# Patient Record
Sex: MALE | Race: WHITE | Employment: FULL TIME | URBAN - METROPOLITAN AREA
[De-identification: names, ages, dates, MRNs, and addresses within clinical notes are randomized per-mention and may not be internally consistent; named-entity substitution may affect disease eponyms.]

---

## 2019-02-05 ENCOUNTER — ANESTHESIA EVENT (OUTPATIENT)
Dept: SURGERY | Age: 34
DRG: 519 | End: 2019-02-05
Payer: COMMERCIAL

## 2019-02-05 ENCOUNTER — HOSPITAL ENCOUNTER (EMERGENCY)
Dept: MRI IMAGING | Age: 34
Discharge: HOME | DRG: 519 | End: 2019-02-05
Attending: EMERGENCY MEDICINE
Payer: COMMERCIAL

## 2019-02-05 ENCOUNTER — ANESTHESIA (OUTPATIENT)
Dept: SURGERY | Age: 34
DRG: 519 | End: 2019-02-05
Payer: COMMERCIAL

## 2019-02-05 ENCOUNTER — HOSPITAL ENCOUNTER (INPATIENT)
Age: 34
LOS: 1 days | Discharge: HOME | DRG: 519 | End: 2019-02-06
Attending: EMERGENCY MEDICINE | Admitting: FAMILY MEDICINE
Payer: COMMERCIAL

## 2019-02-05 ENCOUNTER — APPOINTMENT (OUTPATIENT)
Dept: GENERAL RADIOLOGY | Age: 34
DRG: 519 | End: 2019-02-05
Attending: NEUROLOGICAL SURGERY
Payer: COMMERCIAL

## 2019-02-05 ENCOUNTER — APPOINTMENT (OUTPATIENT)
Dept: ULTRASOUND IMAGING | Age: 34
DRG: 519 | End: 2019-02-05
Attending: FAMILY MEDICINE
Payer: COMMERCIAL

## 2019-02-05 ENCOUNTER — APPOINTMENT (OUTPATIENT)
Dept: GENERAL RADIOLOGY | Age: 34
DRG: 519 | End: 2019-02-05
Attending: EMERGENCY MEDICINE
Payer: COMMERCIAL

## 2019-02-05 DIAGNOSIS — M21.372 FOOT DROP, LEFT: ICD-10-CM

## 2019-02-05 DIAGNOSIS — M51.26 RUPTURED LUMBAR INTERVERTEBRAL DISC: Primary | ICD-10-CM

## 2019-02-05 DIAGNOSIS — Z98.890 S/P DISKECTOMY: ICD-10-CM

## 2019-02-05 DIAGNOSIS — Z98.890 S/P LUMBAR LAMINECTOMY: ICD-10-CM

## 2019-02-05 DIAGNOSIS — M51.26 LUMBAR DISC HERNIATION: ICD-10-CM

## 2019-02-05 LAB
ABO + RH BLD: NORMAL
ALBUMIN SERPL-MCNC: 3.9 G/DL (ref 3.5–5)
ALBUMIN/GLOB SERPL: 1.1 {RATIO} (ref 1.1–2.2)
ALP SERPL-CCNC: 53 U/L (ref 45–117)
ALT SERPL-CCNC: 135 U/L (ref 12–78)
ANION GAP SERPL CALC-SCNC: 11 MMOL/L (ref 5–15)
APPEARANCE UR: CLEAR
APTT PPP: 22.4 SEC (ref 22.1–32)
AST SERPL-CCNC: 55 U/L (ref 15–37)
ATRIAL RATE: 97 BPM
BACTERIA URNS QL MICRO: ABNORMAL /HPF
BASOPHILS # BLD: 0 K/UL (ref 0–0.1)
BASOPHILS NFR BLD: 0 % (ref 0–1)
BILIRUB SERPL-MCNC: 0.5 MG/DL (ref 0.2–1)
BILIRUB UR QL: NEGATIVE
BLOOD GROUP ANTIBODIES SERPL: NORMAL
BUN SERPL-MCNC: 14 MG/DL (ref 6–20)
BUN/CREAT SERPL: 14 (ref 12–20)
CALCIUM SERPL-MCNC: 8.6 MG/DL (ref 8.5–10.1)
CALCULATED P AXIS, ECG09: 62 DEGREES
CALCULATED R AXIS, ECG10: 45 DEGREES
CALCULATED T AXIS, ECG11: 45 DEGREES
CHLORIDE SERPL-SCNC: 101 MMOL/L (ref 97–108)
CO2 SERPL-SCNC: 26 MMOL/L (ref 21–32)
COLOR UR: ABNORMAL
COMMENT, HOLDF: NORMAL
CREAT SERPL-MCNC: 0.97 MG/DL (ref 0.7–1.3)
DIAGNOSIS, 93000: NORMAL
DIFFERENTIAL METHOD BLD: ABNORMAL
EOSINOPHIL # BLD: 0 K/UL (ref 0–0.4)
EOSINOPHIL NFR BLD: 0 % (ref 0–7)
EPITH CASTS URNS QL MICRO: ABNORMAL /LPF
ERYTHROCYTE [DISTWIDTH] IN BLOOD BY AUTOMATED COUNT: 13.2 % (ref 11.5–14.5)
GLOBULIN SER CALC-MCNC: 3.7 G/DL (ref 2–4)
GLUCOSE SERPL-MCNC: 111 MG/DL (ref 65–100)
GLUCOSE UR STRIP.AUTO-MCNC: NEGATIVE MG/DL
HCT VFR BLD AUTO: 48.2 % (ref 36.6–50.3)
HGB BLD-MCNC: 16.4 G/DL (ref 12.1–17)
HGB UR QL STRIP: NEGATIVE
IMM GRANULOCYTES # BLD AUTO: 0 K/UL (ref 0–0.04)
IMM GRANULOCYTES NFR BLD AUTO: 0 % (ref 0–0.5)
INR PPP: 1 (ref 0.9–1.1)
KETONES UR QL STRIP.AUTO: NEGATIVE MG/DL
LEUKOCYTE ESTERASE UR QL STRIP.AUTO: NEGATIVE
LYMPHOCYTES # BLD: 0.7 K/UL (ref 0.8–3.5)
LYMPHOCYTES NFR BLD: 10 % (ref 12–49)
MCH RBC QN AUTO: 29.7 PG (ref 26–34)
MCHC RBC AUTO-ENTMCNC: 34 G/DL (ref 30–36.5)
MCV RBC AUTO: 87.2 FL (ref 80–99)
MONOCYTES # BLD: 0.1 K/UL (ref 0–1)
MONOCYTES NFR BLD: 2 % (ref 5–13)
NEUTS SEG # BLD: 6.3 K/UL (ref 1.8–8)
NEUTS SEG NFR BLD: 88 % (ref 32–75)
NITRITE UR QL STRIP.AUTO: NEGATIVE
NRBC # BLD: 0 K/UL (ref 0–0.01)
NRBC BLD-RTO: 0 PER 100 WBC
P-R INTERVAL, ECG05: 166 MS
PH UR STRIP: 7 [PH] (ref 5–8)
PLATELET # BLD AUTO: 242 K/UL (ref 150–400)
PMV BLD AUTO: 10.2 FL (ref 8.9–12.9)
POTASSIUM SERPL-SCNC: 4.7 MMOL/L (ref 3.5–5.1)
PROT SERPL-MCNC: 7.6 G/DL (ref 6.4–8.2)
PROT UR STRIP-MCNC: ABNORMAL MG/DL
PROTHROMBIN TIME: 10 SEC (ref 9–11.1)
Q-T INTERVAL, ECG07: 320 MS
QRS DURATION, ECG06: 82 MS
QTC CALCULATION (BEZET), ECG08: 406 MS
RBC # BLD AUTO: 5.53 M/UL (ref 4.1–5.7)
RBC #/AREA URNS HPF: ABNORMAL /HPF (ref 0–5)
RBC MORPH BLD: ABNORMAL
SAMPLES BEING HELD,HOLD: NORMAL
SODIUM SERPL-SCNC: 138 MMOL/L (ref 136–145)
SP GR UR REFRACTOMETRY: 1.01 (ref 1–1.03)
SPECIMEN EXP DATE BLD: NORMAL
THERAPEUTIC RANGE,PTTT: NORMAL SECS (ref 58–77)
UR CULT HOLD, URHOLD: NORMAL
UROBILINOGEN UR QL STRIP.AUTO: 0.2 EU/DL (ref 0.2–1)
VENTRICULAR RATE, ECG03: 97 BPM
WBC # BLD AUTO: 7.1 K/UL (ref 4.1–11.1)
WBC URNS QL MICRO: ABNORMAL /HPF (ref 0–4)

## 2019-02-05 PROCEDURE — 77030003028 HC SUT VCRL J&J -A: Performed by: NEUROLOGICAL SURGERY

## 2019-02-05 PROCEDURE — 65660000000 HC RM CCU STEPDOWN

## 2019-02-05 PROCEDURE — 77030014650 HC SEAL MTRX FLOSEL BAXT -C: Performed by: NEUROLOGICAL SURGERY

## 2019-02-05 PROCEDURE — 99283 EMERGENCY DEPT VISIT LOW MDM: CPT

## 2019-02-05 PROCEDURE — 74011250636 HC RX REV CODE- 250/636: Performed by: EMERGENCY MEDICINE

## 2019-02-05 PROCEDURE — 77030029099 HC BN WAX SSPC -A: Performed by: NEUROLOGICAL SURGERY

## 2019-02-05 PROCEDURE — 74011250636 HC RX REV CODE- 250/636

## 2019-02-05 PROCEDURE — 99284 EMERGENCY DEPT VISIT MOD MDM: CPT

## 2019-02-05 PROCEDURE — 77030011264 HC ELECTRD BLD EXT COVD -A: Performed by: NEUROLOGICAL SURGERY

## 2019-02-05 PROCEDURE — 85025 COMPLETE CBC W/AUTO DIFF WBC: CPT

## 2019-02-05 PROCEDURE — 77030003029 HC SUT VCRL J&J -B: Performed by: NEUROLOGICAL SURGERY

## 2019-02-05 PROCEDURE — 85610 PROTHROMBIN TIME: CPT

## 2019-02-05 PROCEDURE — 0ST20ZZ RESECTION OF LUMBAR VERTEBRAL DISC, OPEN APPROACH: ICD-10-PCS | Performed by: NEUROLOGICAL SURGERY

## 2019-02-05 PROCEDURE — 77030026438 HC STYL ET INTUB CARD -A: Performed by: NURSE ANESTHETIST, CERTIFIED REGISTERED

## 2019-02-05 PROCEDURE — 74011250637 HC RX REV CODE- 250/637: Performed by: FAMILY MEDICINE

## 2019-02-05 PROCEDURE — 77030008684 HC TU ET CUF COVD -B: Performed by: NURSE ANESTHETIST, CERTIFIED REGISTERED

## 2019-02-05 PROCEDURE — 93005 ELECTROCARDIOGRAM TRACING: CPT

## 2019-02-05 PROCEDURE — 77030013079 HC BLNKT BAIR HGGR 3M -A: Performed by: NURSE ANESTHETIST, CERTIFIED REGISTERED

## 2019-02-05 PROCEDURE — 80307 DRUG TEST PRSMV CHEM ANLYZR: CPT

## 2019-02-05 PROCEDURE — 72148 MRI LUMBAR SPINE W/O DYE: CPT

## 2019-02-05 PROCEDURE — 81001 URINALYSIS AUTO W/SCOPE: CPT

## 2019-02-05 PROCEDURE — 76705 ECHO EXAM OF ABDOMEN: CPT

## 2019-02-05 PROCEDURE — 88304 TISSUE EXAM BY PATHOLOGIST: CPT

## 2019-02-05 PROCEDURE — 74011000250 HC RX REV CODE- 250: Performed by: NEUROLOGICAL SURGERY

## 2019-02-05 PROCEDURE — 96374 THER/PROPH/DIAG INJ IV PUSH: CPT

## 2019-02-05 PROCEDURE — 36415 COLL VENOUS BLD VENIPUNCTURE: CPT

## 2019-02-05 PROCEDURE — 74011250636 HC RX REV CODE- 250/636: Performed by: ANESTHESIOLOGY

## 2019-02-05 PROCEDURE — 80053 COMPREHEN METABOLIC PANEL: CPT

## 2019-02-05 PROCEDURE — 76210000016 HC OR PH I REC 1 TO 1.5 HR: Performed by: NEUROLOGICAL SURGERY

## 2019-02-05 PROCEDURE — 77030002933 HC SUT MCRYL J&J -A: Performed by: NEUROLOGICAL SURGERY

## 2019-02-05 PROCEDURE — 74011250636 HC RX REV CODE- 250/636: Performed by: FAMILY MEDICINE

## 2019-02-05 PROCEDURE — 72100 X-RAY EXAM L-S SPINE 2/3 VWS: CPT

## 2019-02-05 PROCEDURE — 77030018836 HC SOL IRR NACL ICUM -A: Performed by: NEUROLOGICAL SURGERY

## 2019-02-05 PROCEDURE — 76060000035 HC ANESTHESIA 2 TO 2.5 HR: Performed by: NEUROLOGICAL SURGERY

## 2019-02-05 PROCEDURE — 96361 HYDRATE IV INFUSION ADD-ON: CPT

## 2019-02-05 PROCEDURE — 86900 BLOOD TYPING SEROLOGIC ABO: CPT

## 2019-02-05 PROCEDURE — 85730 THROMBOPLASTIN TIME PARTIAL: CPT

## 2019-02-05 PROCEDURE — 74011000250 HC RX REV CODE- 250

## 2019-02-05 PROCEDURE — 76000 FLUOROSCOPY <1 HR PHYS/QHP: CPT

## 2019-02-05 PROCEDURE — 76010000171 HC OR TIME 2 TO 2.5 HR INTENSV-TIER 1: Performed by: NEUROLOGICAL SURGERY

## 2019-02-05 PROCEDURE — 74011250637 HC RX REV CODE- 250/637: Performed by: EMERGENCY MEDICINE

## 2019-02-05 RX ORDER — MIDAZOLAM HYDROCHLORIDE 1 MG/ML
INJECTION, SOLUTION INTRAMUSCULAR; INTRAVENOUS AS NEEDED
Status: DISCONTINUED | OUTPATIENT
Start: 2019-02-05 | End: 2019-02-05 | Stop reason: HOSPADM

## 2019-02-05 RX ORDER — SUCCINYLCHOLINE CHLORIDE 20 MG/ML
INJECTION INTRAMUSCULAR; INTRAVENOUS AS NEEDED
Status: DISCONTINUED | OUTPATIENT
Start: 2019-02-05 | End: 2019-02-05 | Stop reason: HOSPADM

## 2019-02-05 RX ORDER — HYDROMORPHONE HYDROCHLORIDE 2 MG/ML
1 INJECTION, SOLUTION INTRAMUSCULAR; INTRAVENOUS; SUBCUTANEOUS
Status: DISCONTINUED | OUTPATIENT
Start: 2019-02-05 | End: 2019-02-06

## 2019-02-05 RX ORDER — CYCLOBENZAPRINE HCL 10 MG
5 TABLET ORAL
Status: DISCONTINUED | OUTPATIENT
Start: 2019-02-05 | End: 2019-02-06

## 2019-02-05 RX ORDER — MORPHINE SULFATE 10 MG/ML
2 INJECTION, SOLUTION INTRAMUSCULAR; INTRAVENOUS
Status: DISCONTINUED | OUTPATIENT
Start: 2019-02-05 | End: 2019-02-06 | Stop reason: HOSPADM

## 2019-02-05 RX ORDER — FENTANYL CITRATE 50 UG/ML
25 INJECTION, SOLUTION INTRAMUSCULAR; INTRAVENOUS
Status: DISCONTINUED | OUTPATIENT
Start: 2019-02-05 | End: 2019-02-06 | Stop reason: HOSPADM

## 2019-02-05 RX ORDER — HYDROMORPHONE HYDROCHLORIDE 2 MG/ML
1 INJECTION, SOLUTION INTRAMUSCULAR; INTRAVENOUS; SUBCUTANEOUS ONCE
Status: COMPLETED | OUTPATIENT
Start: 2019-02-05 | End: 2019-02-05

## 2019-02-05 RX ORDER — MIDAZOLAM HYDROCHLORIDE 1 MG/ML
1 INJECTION, SOLUTION INTRAMUSCULAR; INTRAVENOUS AS NEEDED
Status: DISCONTINUED | OUTPATIENT
Start: 2019-02-05 | End: 2019-02-05 | Stop reason: HOSPADM

## 2019-02-05 RX ORDER — PROPOFOL 10 MG/ML
INJECTION, EMULSION INTRAVENOUS AS NEEDED
Status: DISCONTINUED | OUTPATIENT
Start: 2019-02-05 | End: 2019-02-05 | Stop reason: HOSPADM

## 2019-02-05 RX ORDER — ROCURONIUM BROMIDE 10 MG/ML
INJECTION, SOLUTION INTRAVENOUS AS NEEDED
Status: DISCONTINUED | OUTPATIENT
Start: 2019-02-05 | End: 2019-02-05 | Stop reason: HOSPADM

## 2019-02-05 RX ORDER — SODIUM CHLORIDE 0.9 % (FLUSH) 0.9 %
5-40 SYRINGE (ML) INJECTION AS NEEDED
Status: DISCONTINUED | OUTPATIENT
Start: 2019-02-05 | End: 2019-02-05 | Stop reason: HOSPADM

## 2019-02-05 RX ORDER — ONDANSETRON 2 MG/ML
4 INJECTION INTRAMUSCULAR; INTRAVENOUS AS NEEDED
Status: DISCONTINUED | OUTPATIENT
Start: 2019-02-05 | End: 2019-02-06 | Stop reason: HOSPADM

## 2019-02-05 RX ORDER — OXYCODONE HYDROCHLORIDE 5 MG/1
5 TABLET ORAL AS NEEDED
Status: DISCONTINUED | OUTPATIENT
Start: 2019-02-05 | End: 2019-02-06 | Stop reason: HOSPADM

## 2019-02-05 RX ORDER — ONDANSETRON 2 MG/ML
4 INJECTION INTRAMUSCULAR; INTRAVENOUS
Status: DISCONTINUED | OUTPATIENT
Start: 2019-02-05 | End: 2019-02-06 | Stop reason: HOSPADM

## 2019-02-05 RX ORDER — ACETAMINOPHEN 325 MG/1
650 TABLET ORAL
Status: DISCONTINUED | OUTPATIENT
Start: 2019-02-05 | End: 2019-02-06 | Stop reason: HOSPADM

## 2019-02-05 RX ORDER — SODIUM CHLORIDE, SODIUM LACTATE, POTASSIUM CHLORIDE, CALCIUM CHLORIDE 600; 310; 30; 20 MG/100ML; MG/100ML; MG/100ML; MG/100ML
125 INJECTION, SOLUTION INTRAVENOUS CONTINUOUS
Status: DISCONTINUED | OUTPATIENT
Start: 2019-02-06 | End: 2019-02-06 | Stop reason: HOSPADM

## 2019-02-05 RX ORDER — SODIUM CHLORIDE, SODIUM LACTATE, POTASSIUM CHLORIDE, CALCIUM CHLORIDE 600; 310; 30; 20 MG/100ML; MG/100ML; MG/100ML; MG/100ML
125 INJECTION, SOLUTION INTRAVENOUS CONTINUOUS
Status: DISCONTINUED | OUTPATIENT
Start: 2019-02-05 | End: 2019-02-05 | Stop reason: HOSPADM

## 2019-02-05 RX ORDER — DEXAMETHASONE SODIUM PHOSPHATE 4 MG/ML
INJECTION, SOLUTION INTRA-ARTICULAR; INTRALESIONAL; INTRAMUSCULAR; INTRAVENOUS; SOFT TISSUE AS NEEDED
Status: DISCONTINUED | OUTPATIENT
Start: 2019-02-05 | End: 2019-02-05 | Stop reason: HOSPADM

## 2019-02-05 RX ORDER — SODIUM CHLORIDE 0.9 % (FLUSH) 0.9 %
5-40 SYRINGE (ML) INJECTION EVERY 8 HOURS
Status: DISCONTINUED | OUTPATIENT
Start: 2019-02-05 | End: 2019-02-06 | Stop reason: HOSPADM

## 2019-02-05 RX ORDER — FENTANYL CITRATE 50 UG/ML
50 INJECTION, SOLUTION INTRAMUSCULAR; INTRAVENOUS AS NEEDED
Status: DISCONTINUED | OUTPATIENT
Start: 2019-02-05 | End: 2019-02-05 | Stop reason: HOSPADM

## 2019-02-05 RX ORDER — PHENYLEPHRINE HCL IN 0.9% NACL 0.4MG/10ML
SYRINGE (ML) INTRAVENOUS AS NEEDED
Status: DISCONTINUED | OUTPATIENT
Start: 2019-02-05 | End: 2019-02-05 | Stop reason: HOSPADM

## 2019-02-05 RX ORDER — GLYCOPYRROLATE 0.2 MG/ML
INJECTION INTRAMUSCULAR; INTRAVENOUS AS NEEDED
Status: DISCONTINUED | OUTPATIENT
Start: 2019-02-05 | End: 2019-02-05 | Stop reason: HOSPADM

## 2019-02-05 RX ORDER — DEXTROSE, SODIUM CHLORIDE, SODIUM LACTATE, POTASSIUM CHLORIDE, AND CALCIUM CHLORIDE 5; .6; .31; .03; .02 G/100ML; G/100ML; G/100ML; G/100ML; G/100ML
125 INJECTION, SOLUTION INTRAVENOUS CONTINUOUS
Status: DISCONTINUED | OUTPATIENT
Start: 2019-02-05 | End: 2019-02-05 | Stop reason: HOSPADM

## 2019-02-05 RX ORDER — OXYCODONE AND ACETAMINOPHEN 5; 325 MG/1; MG/1
1 TABLET ORAL
Status: COMPLETED | OUTPATIENT
Start: 2019-02-05 | End: 2019-02-05

## 2019-02-05 RX ORDER — MIDAZOLAM HYDROCHLORIDE 1 MG/ML
1 INJECTION, SOLUTION INTRAMUSCULAR; INTRAVENOUS
Status: DISCONTINUED | OUTPATIENT
Start: 2019-02-05 | End: 2019-02-06 | Stop reason: HOSPADM

## 2019-02-05 RX ORDER — TRAMADOL HYDROCHLORIDE 50 MG/1
50 TABLET ORAL
Status: COMPLETED | OUTPATIENT
Start: 2019-02-05 | End: 2019-02-05

## 2019-02-05 RX ORDER — DEXAMETHASONE 4 MG/1
10 TABLET ORAL
Status: COMPLETED | OUTPATIENT
Start: 2019-02-05 | End: 2019-02-05

## 2019-02-05 RX ORDER — ONDANSETRON 2 MG/ML
INJECTION INTRAMUSCULAR; INTRAVENOUS AS NEEDED
Status: DISCONTINUED | OUTPATIENT
Start: 2019-02-05 | End: 2019-02-05 | Stop reason: HOSPADM

## 2019-02-05 RX ORDER — SODIUM CHLORIDE 0.9 % (FLUSH) 0.9 %
5-40 SYRINGE (ML) INJECTION AS NEEDED
Status: DISCONTINUED | OUTPATIENT
Start: 2019-02-05 | End: 2019-02-06 | Stop reason: HOSPADM

## 2019-02-05 RX ORDER — ACETAMINOPHEN 10 MG/ML
INJECTION, SOLUTION INTRAVENOUS AS NEEDED
Status: DISCONTINUED | OUTPATIENT
Start: 2019-02-05 | End: 2019-02-05 | Stop reason: HOSPADM

## 2019-02-05 RX ORDER — BUPIVACAINE HYDROCHLORIDE AND EPINEPHRINE 5; 5 MG/ML; UG/ML
30 INJECTION, SOLUTION EPIDURAL; INTRACAUDAL; PERINEURAL ONCE
Status: COMPLETED | OUTPATIENT
Start: 2019-02-05 | End: 2019-02-05

## 2019-02-05 RX ORDER — LIDOCAINE HYDROCHLORIDE 20 MG/ML
INJECTION, SOLUTION EPIDURAL; INFILTRATION; INTRACAUDAL; PERINEURAL AS NEEDED
Status: DISCONTINUED | OUTPATIENT
Start: 2019-02-05 | End: 2019-02-05 | Stop reason: HOSPADM

## 2019-02-05 RX ORDER — SODIUM CHLORIDE 9 MG/ML
125 INJECTION, SOLUTION INTRAVENOUS CONTINUOUS
Status: DISCONTINUED | OUTPATIENT
Start: 2019-02-06 | End: 2019-02-06

## 2019-02-05 RX ORDER — CEFAZOLIN SODIUM IN 0.9 % NACL 2 G/100 ML
PLASTIC BAG, INJECTION (ML) INTRAVENOUS AS NEEDED
Status: DISCONTINUED | OUTPATIENT
Start: 2019-02-05 | End: 2019-02-05 | Stop reason: HOSPADM

## 2019-02-05 RX ORDER — SODIUM CHLORIDE 0.9 % (FLUSH) 0.9 %
5-40 SYRINGE (ML) INJECTION EVERY 8 HOURS
Status: DISCONTINUED | OUTPATIENT
Start: 2019-02-06 | End: 2019-02-06 | Stop reason: HOSPADM

## 2019-02-05 RX ORDER — NEOSTIGMINE METHYLSULFATE 1 MG/ML
INJECTION INTRAVENOUS AS NEEDED
Status: DISCONTINUED | OUTPATIENT
Start: 2019-02-05 | End: 2019-02-05 | Stop reason: HOSPADM

## 2019-02-05 RX ORDER — DEXMEDETOMIDINE HYDROCHLORIDE 4 UG/ML
INJECTION, SOLUTION INTRAVENOUS AS NEEDED
Status: DISCONTINUED | OUTPATIENT
Start: 2019-02-05 | End: 2019-02-05 | Stop reason: HOSPADM

## 2019-02-05 RX ORDER — LIDOCAINE HYDROCHLORIDE 10 MG/ML
0.5 INJECTION, SOLUTION EPIDURAL; INFILTRATION; INTRACAUDAL; PERINEURAL AS NEEDED
Status: DISCONTINUED | OUTPATIENT
Start: 2019-02-05 | End: 2019-02-05 | Stop reason: HOSPADM

## 2019-02-05 RX ORDER — FENTANYL CITRATE 50 UG/ML
INJECTION, SOLUTION INTRAMUSCULAR; INTRAVENOUS AS NEEDED
Status: DISCONTINUED | OUTPATIENT
Start: 2019-02-05 | End: 2019-02-05 | Stop reason: HOSPADM

## 2019-02-05 RX ORDER — SODIUM CHLORIDE 9 MG/ML
75 INJECTION, SOLUTION INTRAVENOUS CONTINUOUS
Status: DISCONTINUED | OUTPATIENT
Start: 2019-02-05 | End: 2019-02-06 | Stop reason: HOSPADM

## 2019-02-05 RX ORDER — HEPARIN SODIUM 5000 [USP'U]/ML
5000 INJECTION, SOLUTION INTRAVENOUS; SUBCUTANEOUS EVERY 8 HOURS
Status: DISCONTINUED | OUTPATIENT
Start: 2019-02-05 | End: 2019-02-06

## 2019-02-05 RX ORDER — DIAZEPAM 5 MG/1
5 TABLET ORAL
Status: COMPLETED | OUTPATIENT
Start: 2019-02-05 | End: 2019-02-05

## 2019-02-05 RX ORDER — SODIUM CHLORIDE 0.9 % (FLUSH) 0.9 %
5-40 SYRINGE (ML) INJECTION EVERY 8 HOURS
Status: DISCONTINUED | OUTPATIENT
Start: 2019-02-05 | End: 2019-02-05 | Stop reason: HOSPADM

## 2019-02-05 RX ADMIN — NEOSTIGMINE METHYLSULFATE 5 MG: 1 INJECTION INTRAVENOUS at 22:56

## 2019-02-05 RX ADMIN — SODIUM CHLORIDE, SODIUM LACTATE, POTASSIUM CHLORIDE, AND CALCIUM CHLORIDE 125 ML/HR: 600; 310; 30; 20 INJECTION, SOLUTION INTRAVENOUS at 20:40

## 2019-02-05 RX ADMIN — TRAMADOL HYDROCHLORIDE 50 MG: 50 TABLET, FILM COATED ORAL at 08:38

## 2019-02-05 RX ADMIN — MIDAZOLAM HYDROCHLORIDE 1 MG: 1 INJECTION, SOLUTION INTRAMUSCULAR; INTRAVENOUS at 21:11

## 2019-02-05 RX ADMIN — ROCURONIUM BROMIDE 45 MG: 10 INJECTION, SOLUTION INTRAVENOUS at 21:26

## 2019-02-05 RX ADMIN — Medication 80 MCG: at 22:09

## 2019-02-05 RX ADMIN — DEXMEDETOMIDINE HYDROCHLORIDE 4 MCG: 4 INJECTION, SOLUTION INTRAVENOUS at 22:55

## 2019-02-05 RX ADMIN — Medication 3 G: at 21:40

## 2019-02-05 RX ADMIN — SUCCINYLCHOLINE CHLORIDE 200 MG: 20 INJECTION INTRAMUSCULAR; INTRAVENOUS at 21:16

## 2019-02-05 RX ADMIN — Medication 10 ML: at 18:39

## 2019-02-05 RX ADMIN — MIDAZOLAM HYDROCHLORIDE 1 MG: 1 INJECTION, SOLUTION INTRAMUSCULAR; INTRAVENOUS at 21:14

## 2019-02-05 RX ADMIN — DEXAMETHASONE 10 MG: 4 TABLET ORAL at 08:37

## 2019-02-05 RX ADMIN — FENTANYL CITRATE 50 MCG: 50 INJECTION, SOLUTION INTRAMUSCULAR; INTRAVENOUS at 23:11

## 2019-02-05 RX ADMIN — DEXMEDETOMIDINE HYDROCHLORIDE 4 MCG: 4 INJECTION, SOLUTION INTRAVENOUS at 22:52

## 2019-02-05 RX ADMIN — GLYCOPYRROLATE 0.6 MG: 0.2 INJECTION INTRAMUSCULAR; INTRAVENOUS at 22:56

## 2019-02-05 RX ADMIN — ROCURONIUM BROMIDE 10 MG: 10 INJECTION, SOLUTION INTRAVENOUS at 22:26

## 2019-02-05 RX ADMIN — CYCLOBENZAPRINE HYDROCHLORIDE 5 MG: 10 TABLET, FILM COATED ORAL at 16:20

## 2019-02-05 RX ADMIN — FENTANYL CITRATE 25 MCG: 50 INJECTION INTRAMUSCULAR; INTRAVENOUS at 23:45

## 2019-02-05 RX ADMIN — OXYCODONE HYDROCHLORIDE AND ACETAMINOPHEN 1 TABLET: 5; 325 TABLET ORAL at 09:56

## 2019-02-05 RX ADMIN — ONDANSETRON 4 MG: 2 INJECTION INTRAMUSCULAR; INTRAVENOUS at 21:36

## 2019-02-05 RX ADMIN — FENTANYL CITRATE 50 MCG: 50 INJECTION, SOLUTION INTRAMUSCULAR; INTRAVENOUS at 21:16

## 2019-02-05 RX ADMIN — DIAZEPAM 5 MG: 5 TABLET ORAL at 10:49

## 2019-02-05 RX ADMIN — ROCURONIUM BROMIDE 5 MG: 10 INJECTION, SOLUTION INTRAVENOUS at 21:16

## 2019-02-05 RX ADMIN — HYDROMORPHONE HYDROCHLORIDE 1 MG: 2 INJECTION, SOLUTION INTRAMUSCULAR; INTRAVENOUS; SUBCUTANEOUS at 16:03

## 2019-02-05 RX ADMIN — Medication 120 MCG: at 22:15

## 2019-02-05 RX ADMIN — LIDOCAINE HYDROCHLORIDE 100 MG: 20 INJECTION, SOLUTION EPIDURAL; INFILTRATION; INTRACAUDAL; PERINEURAL at 21:16

## 2019-02-05 RX ADMIN — SODIUM CHLORIDE 75 ML/HR: 900 INJECTION, SOLUTION INTRAVENOUS at 18:53

## 2019-02-05 RX ADMIN — DEXMEDETOMIDINE HYDROCHLORIDE 8 MCG: 4 INJECTION, SOLUTION INTRAVENOUS at 21:36

## 2019-02-05 RX ADMIN — DEXMEDETOMIDINE HYDROCHLORIDE 4 MCG: 4 INJECTION, SOLUTION INTRAVENOUS at 22:49

## 2019-02-05 RX ADMIN — FENTANYL CITRATE 50 MCG: 50 INJECTION, SOLUTION INTRAMUSCULAR; INTRAVENOUS at 22:59

## 2019-02-05 RX ADMIN — FENTANYL CITRATE: 50 INJECTION INTRAMUSCULAR; INTRAVENOUS at 23:50

## 2019-02-05 RX ADMIN — DEXAMETHASONE SODIUM PHOSPHATE 8 MG: 4 INJECTION, SOLUTION INTRA-ARTICULAR; INTRALESIONAL; INTRAMUSCULAR; INTRAVENOUS; SOFT TISSUE at 21:36

## 2019-02-05 RX ADMIN — FENTANYL CITRATE 50 MCG: 50 INJECTION, SOLUTION INTRAMUSCULAR; INTRAVENOUS at 21:47

## 2019-02-05 RX ADMIN — HYDROMORPHONE HYDROCHLORIDE 1 MG: 2 INJECTION, SOLUTION INTRAMUSCULAR; INTRAVENOUS; SUBCUTANEOUS at 14:13

## 2019-02-05 RX ADMIN — PROPOFOL 170 MG: 10 INJECTION, EMULSION INTRAVENOUS at 21:16

## 2019-02-05 RX ADMIN — ACETAMINOPHEN 1000 MG: 10 INJECTION, SOLUTION INTRAVENOUS at 21:34

## 2019-02-05 RX ADMIN — MIDAZOLAM HYDROCHLORIDE 3 MG: 1 INJECTION, SOLUTION INTRAMUSCULAR; INTRAVENOUS at 21:09

## 2019-02-05 RX ADMIN — SODIUM CHLORIDE 1000 ML: 900 INJECTION, SOLUTION INTRAVENOUS at 13:27

## 2019-02-05 RX ADMIN — ROCURONIUM BROMIDE 20 MG: 10 INJECTION, SOLUTION INTRAVENOUS at 21:56

## 2019-02-05 RX ADMIN — SODIUM CHLORIDE, SODIUM LACTATE, POTASSIUM CHLORIDE, AND CALCIUM CHLORIDE: 600; 310; 30; 20 INJECTION, SOLUTION INTRAVENOUS at 23:15

## 2019-02-05 NOTE — ROUTINE PROCESS
TRANSFER - OUT REPORT: 
 
Verbal report given to Haseeb Redding RN (name) on Rand Singh  being transferred to  [facility] (unit) for routine progression of care Report consisted of patients Situation, Background, Assessment and  
Recommendations(SBAR). Information from the following report(s) SBAR, Kardex, ED Summary, Intake/Output, MAR and Recent Results was reviewed with the receiving nurse. Lines:  
Peripheral IV 02/05/19 Right Antecubital (Active) Opportunity for questions and clarification was provided. Patient transported with: 
 Monitor

## 2019-02-05 NOTE — ED NOTES
36 y.o. male with no significant past medical history, who presents ambulatory to the ED accompanied by spouse, with chief complaint of lower back pain. Pt reports that he has been experiencing intermittent lower back pain for ~1 month. He states that he can sometimes feel his back \"go out\" and he has to not ambulate for a while until it gets better. Pt reports that his pain increased in intensity this morning, stating that the pain is now \"tremendous\". He rates his current pain as \"9-10\"/10 in intensity. Pt notes that he also has loss of feeling in his right leg which is new upon arrival to the ED, stating that he \"cannot feel anything\" below the knee, and that his upper left leg and right buttock are \"numb with decreased sensation\". Denies perfroming any specific action or heavy lifting that may have caused his increased pain. Pt reports that he visited [place] ED earlier today and received an MRI which was significant for \"2 herniated discs\". He notes that he was sent here to been evaluated by Dr. Albertina Greenwood for Neurosurgery. Spouse reports that the patient received Tramadol, which provided relief for ~1-1.5 hours, percocet and valium for \"spasms\". Pt denies being followed by an orthopedist. Pt specifically denies abdominal pain, urinary incontinence or fecal incontinence. There are no other acute medical concerns at this time. Review of medical records indicate that the patient received an MRI this morning. MRI significant for spondylosis at L4-5 and L5-S1 with disc bulge and impingement of right lateral recess and moderate right neural foraminal narrowing. Social hx: Positive for Tobacco use (current every day smoker); Negative for EtOH use; 
PCP: None Note written by Marissa Terrell, as dictated by Gisselle Gómez DO 1:50 PM 
 
 
ROS: 
Positive for: Lower back pain, Numbness of right leg and right buttock. Negative for: abdominal pain Urinary incontinence, fecal incontinence. Physical Exam: Uncomfortable appearing, NAD, Sitting rigidly. HENT normal. Abd soft, non-tender, non-distended. Cardiovascular normal, RRR. Pulmonary normal, lungs clear. Back is significantly tender to palpation at the midline lumbar spine and right paraspinal musculatures. Positive straight leg raise bilaterally. Intact distal pulses. 5/5 strength in BUE, with intact sensation. Intact sensation to LLE with 5/5 strength. 3/5 strength in flexion and extension of RLE, with decreased sensation both anteriorly and posteriorly with evidence of foot drop. No saddle anaesthesia, no reported incontinence. PROGRESS NOTE: 
1:45 PM 
MRI reviewed: See above; Labs reviewed: no significant abnormalities. UA negative. Will give pain medication and consult neurosurgery. CONSULT NOTE: 
2:09 PM Rosa Adams DO spoke with Dr. Valentina Dempsey, Consult for Neurosurgery. Discussed available diagnostic tests and clinical findings. Dr. Vitor Sanchez reports that it may be a difficult decision on operation due to body habitus. He recommends admitting the patient to the hospitalist for pain control. He will evaluate the patient. Hospitalist Adriana for Admission 2:55 PM 
 
ED Room Number: RS97/54 Patient Name and age:  Zev Washington 36 y.o.  male Working Diagnosis: 1. Ruptured lumbar intervertebral disc 2. Foot drop, left Readmission: no 
Isolation Requirements:  no 
Recommended Level of Care:  med/surg Code Status:  Full Code Other:  Right leg weakness and numbness, NS consulted and considering operative vs nonop mgmt. rec'd admission for pain control and Dr. Vitor Sanchez will see. CONSULT NOTE: 
2:55 PM Rosa Adams DO communicated with Dr. Reddy Thao MD, Consult for Hospitalist via [facility] Text. Discussed available diagnostic tests and clinical findings.   Dr. Aguilar Piedra will evaluate the patient for admission to the hospital.  
 
3:00 PM 
 Patient is being admitted to the hospital.  The results of their tests and reasons for their admission have been discussed with them and/or available family. They convey agreement and understanding for the need to be admitted and for their admission diagnosis. Consultation will be made now with the inpatient physician for hospitalization.

## 2019-02-05 NOTE — PROGRESS NOTES
Per patient, he would like his wife to make any medical decisions if he is incapacitated, and not his mother.

## 2019-02-05 NOTE — PROGRESS NOTES
Admission Medication Reconciliation: 
 
Information obtained from: patient Significant PMH/Disease States:  
Past Medical History:  
Diagnosis Date  Nicotine vapor product user Chief Complaint for this Admission:  back pain Allergies:  Patient has no known allergies. Prior to Admission Medications:  
None Comments/Recommendations: Patient is not currently taking any medications.

## 2019-02-05 NOTE — ED NOTES
TRANSFER - OUT REPORT: 
 
Verbal report given to Sydnee Martinez RN(name) on Jennifer Talbot  being transferred to ER(unit) for routine progression of care Report consisted of patients Situation, Background, Assessment and  
Recommendations(SBAR). Information from the following report(s) SBAR and ED Summary was reviewed with the receiving nurse. Lines:  
Peripheral IV 02/05/19 Right Antecubital (Active) Opportunity for questions and clarification was provided.

## 2019-02-05 NOTE — ED TRIAGE NOTES
Pt transferred to [facility] [place] from [address] for lower right sided back pain radiating into RLE, with loss of sensation from R knee down to toes. Pt was taking his dogs out this am, bent over and back went out with onset of loss of sensation to RLE. MRI Spondylosis at L4-5 and L5-S1.

## 2019-02-05 NOTE — ED PROVIDER NOTES
'used to play football/ back issues 'for years'/ then 1 hour ago 'was lying there and my back hurt/ R leg went numb'; pt denies HA, vison changes, diff swallowing, CP, SOB, Abd pain, F/Ch, N/V, D/Cons or other current systemic complaints Pt has taken diclofenac No past medical history on file. No past surgical history on file. No family history on file. Social History Socioeconomic History  Marital status: Not on file Spouse name: Not on file  Number of children: Not on file  Years of education: Not on file  Highest education level: Not on file Social Needs  Financial resource strain: Not on file  Food insecurity - worry: Not on file  Food insecurity - inability: Not on file  Transportation needs - medical: Not on file  Transportation needs - non-medical: Not on file Occupational History  Not on file Tobacco Use  Smoking status: Not on file Substance and Sexual Activity  Alcohol use: Not on file  Drug use: Not on file  Sexual activity: Not on file Other Topics Concern  Not on file Social History Narrative  Not on file ALLERGIES: Patient has no allergy information on record. Review of Systems Musculoskeletal: Positive for back pain. Neurological: Positive for numbness. All other systems reviewed and are negative. Vitals:  
 02/05/19 0825 BP: (!) 140/103 Pulse: (!) 104 Resp: 24 Temp: 97.4 °F (36.3 °C) SpO2: 100% Weight: (!) 190.7 kg (420 lb 6.7 oz) Height: 6' 4\" (1.93 m) Physical Exam  
Constitutional: He is oriented to person, place, and time. He appears well-developed and well-nourished. No distress. Obese, Uncomfortable appearing, AxOx4, speaking in complete sentences HENT:  
Head: Normocephalic and atraumatic. Right Ear: External ear normal.  
Left Ear: External ear normal.  
Mouth/Throat: Oropharynx is clear and moist. No oropharyngeal exudate. Cn intact Eyes: Conjunctivae and EOM are normal. Pupils are equal, round, and reactive to light. Right eye exhibits no discharge. Left eye exhibits no discharge. No scleral icterus. Neck: Normal range of motion. Neck supple. No JVD present. No tracheal deviation present. Cardiovascular: Normal rate, regular rhythm, normal heart sounds and intact distal pulses. Exam reveals no gallop and no friction rub. No murmur heard. Pulmonary/Chest: Effort normal and breath sounds normal. No stridor. No respiratory distress. He has no wheezes. He has no rales. He exhibits no tenderness. Abdominal: Soft. Bowel sounds are normal. He exhibits no distension and no mass. There is no tenderness. There is no rebound and no guarding. No hernia. nttp Genitourinary:  
Genitourinary Comments: Pt denies urinary/ Testicular/ scrotal or penile  Complaints No cauda equina sx Musculoskeletal: He exhibits tenderness. He exhibits no edema or deformity. Lumbar back: He exhibits decreased range of motion, tenderness, bony tenderness and pain. He exhibits no swelling, no edema, no deformity, no laceration, no spasm and normal pulse. Back: 
 
L spine - ttp in area as indicated/ radicular signs/ R leg numbness/ decreased reflexes and ROM 2ary to pain/ Leg raise (+) R with notable weakness compared to L/ unable to dorsiflex R foot/ pt has distal motor/ CV  grossly intact Lymphadenopathy:  
  He has no cervical adenopathy. Neurological: He is alert and oriented to person, place, and time. He displays abnormal reflex. A sensory deficit is present. No cranial nerve deficit. He exhibits normal muscle tone. Coordination normal.  
Skin: Skin is warm and dry. Capillary refill takes less than 2 seconds. No rash noted. No erythema. Psychiatric: He has a normal mood and affect. Nursing note and vitals reviewed. MDM Procedures 8:47 AM 
I walked the dogs at 0530/ then remember I bent over and it hurt/ then about 1 hour ago my R leg went numb'; 
  
9:54 AM 
Pt back from MRI, 'pain coming back'; will treat;  
 
 
10:21 AM 
Pt told of MRI results/ agrees w/ spines consult; Dr Lucy Traylor paged via tiger text;  
 
11:42 AM 
'doing OK'; awaiting consult CONSULT  NOTE 
11:59 AM 
Deisy Dsouza MD communicated  with Dr Lucy Traylor. Specialty: Sylwia Carrington Discussed pt's hx, disposition, and available diagnostic and imaging results. Reviewed care plans. Consulting physician agrees with plans as outlined 'Please send him to [facility] ER/ will need to evaluate for surgery'. Pt told of plans and agrees/ also informed Dr Kris De Santiago in [facility] ED;  
 
ED EKG interpretation: 
Rhythm: normal sinus rhythm; and regular . Rate (approx.): 96; Axis: normal; P wave: normal; QRS interval: normal ; ST/T wave: non-specific changes; in  Lead: ; Other findings: normal. This EKG was interpreted by Deisy Dsouza MD,ED Provider.

## 2019-02-05 NOTE — ED TRIAGE NOTES
Pt states that about an hour ago his back \"went out\" and he lost feeling in his right leg. Pt states that for the past month his back has been going \"in and out\". Pt contributes his back pain to playing sports, but denies any injury.

## 2019-02-05 NOTE — PROGRESS NOTES
Problem: Falls - Risk of 
Goal: *Absence of Falls Document Hamp Leeann Fall Risk and appropriate interventions in the flowsheet. Outcome: Progressing Towards Goal 
Fall Risk Interventions: 
Mobility Interventions: Communicate number of staff needed for ambulation/transfer, OT consult for ADLs, Patient to call before getting OOB, PT Consult for mobility concerns

## 2019-02-05 NOTE — PROGRESS NOTES
TRANSFER - IN REPORT: 
 
Verbal report received from [facility] (name) on Mary Marcelino  being received from ER(unit) for routine progression of care Report consisted of patients Situation, Background, Assessment and  
Recommendations(SBAR). Information from the following report(s) SBAR and Cardiac Rhythm SR was reviewed with the receiving nurse. Opportunity for questions and clarification was provided. Assessment completed upon patients arrival to unit and care assumed.

## 2019-02-05 NOTE — PROGRESS NOTES
Neurosurgery Spine Progress Note Oral Paola AGACNP-BC Work Cell: [phone] February 5, 2019 4:25 PM  
 
Garrett Dorsey HPI:  
  
Garrett Dorsey is a 36 y.o. male without significant medical history who presented to the [place] ED with acute onset of back pain and right leg pain with numbness and weakness. He was bending over to  his dog's droppings this morning when he stood up and had a sudden strange sensation in his back and then subsequent pain and weakness. He was unable to move his right ankle and had to hop back inside his house. He had numbness in his right foot which has progressievly worsened now involving his proximal right lower extremity. He denies any saddle anesthesia or bowel/bladder incontinence. MRI was obtained showing herniated discs at L4-5, L5-S1 with significant impingement of right lateral recess at L4-5. The decision was made to transfer Mr. Herrera to [facility] for neurosurgical evaluation. Subjective  
  
Patient lying supine in bed. Wife is at bedside. His pain is greater than 10/10 with movement. He is numb from the knee down in right leg. He tells me his numbness and weakness have progressed since this am. He is unable to wiggle his toes, dorsiflex, or plantar flex on the right. He is numb in his buttock but denies perineal or genital numbness. Last void was at 12:00pm. He has not eaten since last night. Objective:  
 
Physical Exam: 
General:  Alert and oriented. No acute distress. Respiratory:  Breathing unlabored. Lungs CTA bilaterally Abdomen:  
Extremities: Obese, soft, non-tender No evidence of swelling or cyanosis Neurologic: 
 
 
 
  Speech fluent. Face symmetric. Fund of knowledge intact. Full strength/rom in BUE. Numb from knee down in RLE. Reduced sensation to buttocks and R thigh. Unable to wiggle toes, dorsiflex, or plantarflex on right. 5/5 strength in LLE. +straight leg raise. Mute plantar reflexes. Gait deferred Vital Signs:   
Patient Vitals for the past 8 hrs: 
 BP Temp Pulse Resp SpO2  
19 1530 165/75    95 % 19 1515     95 % 19 1500 158/70    96 % 19 1430 140/88    95 % 19 1400 168/82    94 % 19 1321 163/89 98.7 °F (37.1 °C) (!) 101 20 97 % 19 1230 (!) 169/91 98.6 °F (37 °C) 100 22 98 % 19 1207 (!) 163/105  Liza Jaksch Temp (24hrs), Av.2 °F (36.8 °C), Min:97.4 °F (36.3 °C), Max:98.7 °F (37.1 °C) Intake/Output: 
 0701 -  1900 In: 1000 [I.V.:1000] Out: - No intake/output data recorded. Pain Control:  
Pain Assessment Pain Scale 1: Numeric (0 - 10) Pain Intensity 1: 7 Pain Onset 1: today Pain Location 1: Back Pain Orientation 1: Lower, Right Pain Description 1: Intermittent, Shooting Pain Intervention(s) 1: Medication (see MAR) LAB:   
Recent Labs 19 
1205 HCT 48.2 HGB 16.4 Lab Results Component Value Date/Time Sodium 138 2019 12:05 PM  
 Potassium 4.7 2019 12:05 PM  
 Chloride 101 2019 12:05 PM  
 CO2 26 2019 12:05 PM  
 Glucose 111 (H) 2019 12:05 PM  
 BUN 14 2019 12:05 PM  
 Creatinine 0.97 2019 12:05 PM  
 Calcium 8.6 2019 12:05 PM  
 
 
PT/OT:  
Gait:    
            
 
 
Assessment/Plan  
  
This is a pleasant 35 y/o male with acute onset of back pain with pain, numbness, and weakness in his RLE. MRI shows disc herniations at L4-5, L5-S1 with significant impingement of the right lateral recess. Suspect this is incomplete SYL vs severe lumbar radiculopathy. Patient has been placed NPO for possible decompression this evening. Dr. Karen Calderon to see patient and discuss surgery in further detail. Recommend bladder check to assess for retention. Q4H neurochecks ordered.   
 
 
Signed By: 
 Shawanda Cherry NP

## 2019-02-06 VITALS
BODY MASS INDEX: 38.36 KG/M2 | WEIGHT: 315 LBS | OXYGEN SATURATION: 96 % | DIASTOLIC BLOOD PRESSURE: 85 MMHG | TEMPERATURE: 98.3 F | RESPIRATION RATE: 21 BRPM | HEART RATE: 93 BPM | HEIGHT: 76 IN | SYSTOLIC BLOOD PRESSURE: 166 MMHG

## 2019-02-06 PROBLEM — G83.4 CAUDA EQUINA SYNDROME (HCC): Status: ACTIVE | Noted: 2019-02-05

## 2019-02-06 LAB
ANION GAP SERPL CALC-SCNC: 10 MMOL/L (ref 5–15)
APAP SERPL-MCNC: <2 UG/ML (ref 10–30)
BASOPHILS # BLD: 0 K/UL (ref 0–0.1)
BASOPHILS NFR BLD: 0 % (ref 0–1)
BUN SERPL-MCNC: 18 MG/DL (ref 6–20)
BUN/CREAT SERPL: 18 (ref 12–20)
CALCIUM SERPL-MCNC: 8 MG/DL (ref 8.5–10.1)
CHLORIDE SERPL-SCNC: 104 MMOL/L (ref 97–108)
CO2 SERPL-SCNC: 23 MMOL/L (ref 21–32)
CREAT SERPL-MCNC: 1.02 MG/DL (ref 0.7–1.3)
DIFFERENTIAL METHOD BLD: ABNORMAL
EOSINOPHIL # BLD: 0 K/UL (ref 0–0.4)
EOSINOPHIL NFR BLD: 0 % (ref 0–7)
ERYTHROCYTE [DISTWIDTH] IN BLOOD BY AUTOMATED COUNT: 13.2 % (ref 11.5–14.5)
GLUCOSE SERPL-MCNC: 142 MG/DL (ref 65–100)
HCT VFR BLD AUTO: 43.1 % (ref 36.6–50.3)
HGB BLD-MCNC: 14.3 G/DL (ref 12.1–17)
IMM GRANULOCYTES # BLD AUTO: 0 K/UL (ref 0–0.04)
IMM GRANULOCYTES NFR BLD AUTO: 0 % (ref 0–0.5)
LYMPHOCYTES # BLD: 1 K/UL (ref 0.8–3.5)
LYMPHOCYTES NFR BLD: 10 % (ref 12–49)
MCH RBC QN AUTO: 30.8 PG (ref 26–34)
MCHC RBC AUTO-ENTMCNC: 33.2 G/DL (ref 30–36.5)
MCV RBC AUTO: 92.9 FL (ref 80–99)
MONOCYTES # BLD: 0.5 K/UL (ref 0–1)
MONOCYTES NFR BLD: 5 % (ref 5–13)
NEUTS SEG # BLD: 8.6 K/UL (ref 1.8–8)
NEUTS SEG NFR BLD: 84 % (ref 32–75)
NRBC # BLD: 0 K/UL (ref 0–0.01)
NRBC BLD-RTO: 0 PER 100 WBC
PLATELET # BLD AUTO: 234 K/UL (ref 150–400)
PMV BLD AUTO: 10.3 FL (ref 8.9–12.9)
POTASSIUM SERPL-SCNC: 4.2 MMOL/L (ref 3.5–5.1)
RBC # BLD AUTO: 4.64 M/UL (ref 4.1–5.7)
SODIUM SERPL-SCNC: 137 MMOL/L (ref 136–145)
WBC # BLD AUTO: 10.2 K/UL (ref 4.1–11.1)

## 2019-02-06 PROCEDURE — 74011250636 HC RX REV CODE- 250/636: Performed by: FAMILY MEDICINE

## 2019-02-06 PROCEDURE — 97161 PT EVAL LOW COMPLEX 20 MIN: CPT

## 2019-02-06 PROCEDURE — 97116 GAIT TRAINING THERAPY: CPT

## 2019-02-06 PROCEDURE — 74011250637 HC RX REV CODE- 250/637: Performed by: NEUROLOGICAL SURGERY

## 2019-02-06 PROCEDURE — 80048 BASIC METABOLIC PNL TOTAL CA: CPT

## 2019-02-06 PROCEDURE — 74011250637 HC RX REV CODE- 250/637: Performed by: NURSE PRACTITIONER

## 2019-02-06 PROCEDURE — 97165 OT EVAL LOW COMPLEX 30 MIN: CPT | Performed by: OCCUPATIONAL THERAPIST

## 2019-02-06 PROCEDURE — 36415 COLL VENOUS BLD VENIPUNCTURE: CPT

## 2019-02-06 PROCEDURE — 74011250636 HC RX REV CODE- 250/636: Performed by: NEUROLOGICAL SURGERY

## 2019-02-06 PROCEDURE — 94760 N-INVAS EAR/PLS OXIMETRY 1: CPT

## 2019-02-06 PROCEDURE — 85025 COMPLETE CBC W/AUTO DIFF WBC: CPT

## 2019-02-06 PROCEDURE — 74011250637 HC RX REV CODE- 250/637: Performed by: HOSPITALIST

## 2019-02-06 RX ORDER — CYCLOBENZAPRINE HCL 10 MG
10 TABLET ORAL
Qty: 30 TAB | Refills: 0 | Status: SHIPPED | OUTPATIENT
Start: 2019-02-06 | End: 2019-03-15

## 2019-02-06 RX ORDER — MORPHINE SULFATE 2 MG/ML
2 INJECTION, SOLUTION INTRAMUSCULAR; INTRAVENOUS
Status: DISCONTINUED | OUTPATIENT
Start: 2019-02-06 | End: 2019-02-06

## 2019-02-06 RX ORDER — POLYETHYLENE GLYCOL 3350 17 G/17G
17 POWDER, FOR SOLUTION ORAL DAILY
Status: DISCONTINUED | OUTPATIENT
Start: 2019-02-06 | End: 2019-02-06 | Stop reason: HOSPADM

## 2019-02-06 RX ORDER — SODIUM CHLORIDE 0.9 % (FLUSH) 0.9 %
5-40 SYRINGE (ML) INJECTION AS NEEDED
Status: DISCONTINUED | OUTPATIENT
Start: 2019-02-06 | End: 2019-02-06 | Stop reason: HOSPADM

## 2019-02-06 RX ORDER — CYCLOBENZAPRINE HCL 10 MG
10 TABLET ORAL
Status: DISCONTINUED | OUTPATIENT
Start: 2019-02-06 | End: 2019-02-06 | Stop reason: HOSPADM

## 2019-02-06 RX ORDER — NALOXONE HYDROCHLORIDE 0.4 MG/ML
0.4 INJECTION, SOLUTION INTRAMUSCULAR; INTRAVENOUS; SUBCUTANEOUS AS NEEDED
Status: DISCONTINUED | OUTPATIENT
Start: 2019-02-06 | End: 2019-02-06 | Stop reason: HOSPADM

## 2019-02-06 RX ORDER — OXYCODONE HYDROCHLORIDE 5 MG/1
5 TABLET ORAL
Qty: 40 TAB | Refills: 0 | Status: SHIPPED | OUTPATIENT
Start: 2019-02-06 | End: 2019-03-15

## 2019-02-06 RX ORDER — ACETAMINOPHEN 325 MG/1
650 TABLET ORAL EVERY 6 HOURS
Status: DISCONTINUED | OUTPATIENT
Start: 2019-02-06 | End: 2019-02-06 | Stop reason: HOSPADM

## 2019-02-06 RX ORDER — ACETAMINOPHEN 325 MG/1
650 TABLET ORAL
Status: SHIPPED | COMMUNITY
Start: 2019-02-06

## 2019-02-06 RX ORDER — OXYCODONE HYDROCHLORIDE 5 MG/1
10 TABLET ORAL
Status: DISCONTINUED | OUTPATIENT
Start: 2019-02-06 | End: 2019-02-06 | Stop reason: HOSPADM

## 2019-02-06 RX ORDER — IBUPROFEN 200 MG
1 TABLET ORAL DAILY
Status: DISCONTINUED | OUTPATIENT
Start: 2019-02-06 | End: 2019-02-06 | Stop reason: HOSPADM

## 2019-02-06 RX ORDER — FACIAL-BODY WIPES
10 EACH TOPICAL DAILY PRN
Status: DISCONTINUED | OUTPATIENT
Start: 2019-02-06 | End: 2019-02-06 | Stop reason: HOSPADM

## 2019-02-06 RX ORDER — SODIUM CHLORIDE 0.9 % (FLUSH) 0.9 %
5-40 SYRINGE (ML) INJECTION EVERY 8 HOURS
Status: DISCONTINUED | OUTPATIENT
Start: 2019-02-06 | End: 2019-02-06 | Stop reason: HOSPADM

## 2019-02-06 RX ORDER — CYCLOBENZAPRINE HCL 10 MG
10 TABLET ORAL
Qty: 20 TAB | Refills: 0 | Status: SHIPPED | OUTPATIENT
Start: 2019-02-06 | End: 2019-02-06

## 2019-02-06 RX ORDER — NALOXONE HYDROCHLORIDE 4 MG/.1ML
SPRAY NASAL
Qty: 2 EACH | Refills: 0 | Status: SHIPPED | OUTPATIENT
Start: 2019-02-06 | End: 2019-03-15

## 2019-02-06 RX ORDER — ACETAMINOPHEN 325 MG/1
650 TABLET ORAL
Qty: 1 TAB | Refills: 0 | Status: SHIPPED
Start: 2019-02-06 | End: 2019-02-06

## 2019-02-06 RX ORDER — OXYCODONE HYDROCHLORIDE 5 MG/1
5 TABLET ORAL
Status: DISCONTINUED | OUTPATIENT
Start: 2019-02-06 | End: 2019-02-06 | Stop reason: HOSPADM

## 2019-02-06 RX ORDER — OXYCODONE HYDROCHLORIDE 5 MG/1
5 TABLET ORAL
Qty: 15 TAB | Refills: 0 | Status: SHIPPED | OUTPATIENT
Start: 2019-02-06 | End: 2019-02-06

## 2019-02-06 RX ORDER — AMOXICILLIN 250 MG
1 CAPSULE ORAL DAILY
Status: DISCONTINUED | OUTPATIENT
Start: 2019-02-06 | End: 2019-02-06 | Stop reason: HOSPADM

## 2019-02-06 RX ORDER — ONDANSETRON 4 MG/1
4 TABLET, ORALLY DISINTEGRATING ORAL
Status: DISCONTINUED | OUTPATIENT
Start: 2019-02-06 | End: 2019-02-06 | Stop reason: HOSPADM

## 2019-02-06 RX ADMIN — SODIUM CHLORIDE 125 ML/HR: 900 INJECTION, SOLUTION INTRAVENOUS at 00:00

## 2019-02-06 RX ADMIN — ACETAMINOPHEN 650 MG: 325 TABLET ORAL at 08:37

## 2019-02-06 RX ADMIN — OXYCODONE HYDROCHLORIDE 5 MG: 5 TABLET ORAL at 10:54

## 2019-02-06 RX ADMIN — CYCLOBENZAPRINE HYDROCHLORIDE 10 MG: 10 TABLET, FILM COATED ORAL at 08:48

## 2019-02-06 RX ADMIN — Medication 10 ML: at 05:27

## 2019-02-06 RX ADMIN — HEPARIN SODIUM 5000 UNITS: 5000 INJECTION INTRAVENOUS; SUBCUTANEOUS at 02:20

## 2019-02-06 RX ADMIN — FENTANYL CITRATE 25 MCG: 50 INJECTION INTRAMUSCULAR; INTRAVENOUS at 00:01

## 2019-02-06 RX ADMIN — ACETAMINOPHEN 650 MG: 325 TABLET ORAL at 11:41

## 2019-02-06 RX ADMIN — CEFAZOLIN 3 G: 1 INJECTION, POWDER, FOR SOLUTION INTRAMUSCULAR; INTRAVENOUS; PARENTERAL at 08:49

## 2019-02-06 RX ADMIN — HYDROMORPHONE HYDROCHLORIDE 1 MG: 2 INJECTION, SOLUTION INTRAMUSCULAR; INTRAVENOUS; SUBCUTANEOUS at 06:56

## 2019-02-06 RX ADMIN — Medication 10 ML: at 08:38

## 2019-02-06 RX ADMIN — HYDROMORPHONE HYDROCHLORIDE 1 MG: 2 INJECTION, SOLUTION INTRAMUSCULAR; INTRAVENOUS; SUBCUTANEOUS at 02:20

## 2019-02-06 NOTE — PROGRESS NOTES
Satish 35 February 6, 2019 RE: Abdifatah Mercedes To Whom It May Concern, This is to certify that [employer] [place] [employer] must remain out of work from 02/05/19 - 02/19/19 due to a recent hospitalization. Please feel free to contact Dr. Merrill Fret office at [phone] if you have any questions or concerns. Thank you for your assistance in this matter.  
 
 
Sincerely, 
 
 
Sindy Sanchez NP

## 2019-02-06 NOTE — PROGRESS NOTES
physical Therapy EVALUATION/DISCHARGE Patient: Gardenia Olszewski (61 y.o. male) Date: 2/6/2019 Primary Diagnosis: Lumbar disc herniation [M51.26] Procedure(s) (LRB): SPINE LUMBAR LAMINECTOMY L4-5 RIGHT DISCECTOMY (Right) 1 Day Post-Op Precautions: educated on No BLT for back safety ASSESSMENT : 
Based on the objective data described below, the patient presents with right ankle weakness with drop foot and numbness in the dorsum/lateral aspect as well. Facilitated gait x300 ' with increased trunk sway and mild steppage gait vs circumduction to clear his right toes. Discussed improvement in strength so far and anticipate continued improvement given time. Recommended wearing \"regular\" shoes to provide improved right foot clearance during gait. Discussed no BLT precautions, log rolling, and sitting <30-45 min to facilitate healing and prevent additional herniation. No further acute services indicated and safe to mobilize with staff members. He may benefit from outpatient PT if his symptoms persist. 
 
Further skilled acute physical therapy is not indicated at this time. PLAN : 
Discharge Recommendations: Outpatient and None Further Equipment Recommendations for Discharge: None SUBJECTIVE:  
Patient stated It feels like my foot isn't listening to me.  OBJECTIVE DATA SUMMARY:  
HISTORY:   
Past Medical History:  
Diagnosis Date  Cauda equina syndrome ([address] 2/5/2019  Nicotine vapor product user Past Surgical History:  
Procedure Laterality Date  HX MENISCECTOMY    
 left knee  HX ORTHOPAEDIC    
 ligament replacements in knees Prior Level of Function/Home Situation: independent prior to admission Personal factors and/or comorbidities impacting plan of care: obesity Home Situation Home Environment: Apartment # Steps to Enter: 0 One/Two Story Residence: One story Living Alone: No 
Support Systems: Spouse/Significant Other/Partner Patient Expects to be Discharged to[de-identified] [facility] Current DME Used/Available at Home: None EXAMINATION/PRESENTATION/DECISION MAKING:  
Critical Behavior: 
Neurologic State: Alert Orientation Level: Oriented X4 Cognition: Follows commands Hearing: Auditory Auditory Impairment: None Skin:  Dressing clean, dry, and mild bloody drainage visibly contained Edema:  
Range Of Motion: 
AROM: Within functional limits Strength:   
Strength: Within functional limits(except right dorsiflexion 3+/5) Tone & Sensation:  
Tone: Normal 
  
  
  
  
Sensation: Impaired(right foot dorsum and latral aspect) Coordination: 
  
Vision:  
  
Functional Mobility: 
Bed Mobility: 
Rolling: Supervision Supine to Sit: Stand-by assistance Transfers: 
Sit to Stand: Modified independent Balance:  
Sitting: Intact Standing: Intact Ambulation/Gait Training: 
Distance (ft): 300 Feet (ft) Assistive Device: Gait belt Ambulation - Level of Assistance: Stand-by assistance Gait Description (WDL): Exceptions to [facility] Gait Abnormalities: Trunk sway increased; Steppage gait Base of Support: Widened Speed/Razia: Accelerated Mild steppage gait vs circumduction to clear toes on right during swing Stairs: 
Number of Stairs Trained: 5 Stairs - Level of Assistance: Modified independent Rail Use: Right Physical Therapy Evaluation Charge Determination History Examination Presentation Decision-Making MEDIUM  Complexity : 1-2 comorbidities / personal factors will impact the outcome/ POC  LOW Complexity : 1-2 Standardized tests and measures addressing body structure, function, activity limitation and / or participation in recreation  LOW Complexity : Stable, uncomplicated  LOW Complexity : FOTO score of  Based on the above components, the patient evaluation is determined to be of the following complexity level: LOW Pain: 
Pain Scale 1: Numeric (0 - 10) Pain Intensity 1: 0 Pain Location 1: Back Activity Tolerance:  
 
Please refer to the flowsheet for vital signs taken during this treatment. After treatment:  
[x]   Patient left in no apparent distress sitting up in chair 
[]   Patient left in no apparent distress in bed 
[x]   Call bell left within reach [x]   Nursing notified 
[]   Caregiver present 
[]   Bed alarm activated COMMUNICATION/EDUCATION:  
Communication/Collaboration: 
[x]   Fall prevention education was provided and the patient/caregiver indicated understanding. [x]   Patient/family have participated as able and agree with findings and recommendations. []   Patient is unable to participate in plan of care at this time. Findings and recommendations were discussed with: Registered Nurse Thank you for this referral. 
Sophie Salazar, PT, DPT Time Calculation: 28 mins

## 2019-02-06 NOTE — PROGRESS NOTES
TRANSFER - IN REPORT: 
 
Verbal report received from [facility]) on Tom Scarlet  being received from [facility]) for routine progression of care Report consisted of patients Situation, Background, Assessment and  
Recommendations(SBAR). Information from the following report(s) SBAR, MAR, Recent Results and Cardiac Rhythm SR was reviewed with the receiving nurse. Opportunity for questions and clarification was provided.    
 
Assessment completed upon patients arrival to unit and care assumed.  
 
 
 
'

## 2019-02-06 NOTE — PROGRESS NOTES
Reason for Admission: Patient underwent spine lumbar laminectomy L4-5 Right Discectomy RRAT Score: 2 Plan for utilizing home health:  TBD- the patient has PT/OT evaluations pending Likelihood of Readmission:  Low Transition of Care Plan: TBD The CM met with the patient at bedside in order to introduce the role of CM and assess for patients needs. The patient lives in apartment with wife- verified demographics and insurance information. The patient endorses being independent with ADLs and mobility prior to hospitalization- denied use of DME in the home. The patient denied difficulty affording or accessing medications prior to hospitalization. The patient has PT/OT evaluations pending- will await therapy evaluations to assist in determining needs upon discharge. The patient endorses that when he was in college at [employer], he had two knee surgeries, familiar with rehab. The CM will await therapy recommendations. The patient does not have PCP- CM offered to make new PCP appointment, the patient stated he would make his own but would like to review list- CM provided list of [facility] PCPs at bedside, patient stated he wanted to \"get through this\" first. MARKY Fair 
 
CM spoke with Neurosurgery NP- Therapy recommending outpatient therapy vs. none, NP to write prescription. CM met with patient and family at bedside- discussed outpatient therapy- patient's family stated insurance may not cover it, CM stated [facility] has financial assistance, the patient stated they are not concerned right now about that- stated they will make their own appointment if they feel like they need it. Therapy stated outpatient vs. None- patient ambulating well- per PT note, 300 ft.  CM provided education that therapy is not recommending inpatient rehab currently, would not qualify because patient is ambulating 300 ft. With stand-by assistance. The patient's family will transport home upon discharge. The patient denied having any concerns for anticipated discharge, supportive spouse at bedside. The patient's spouse had concerns regarding attending MD- requesting new attending. CM offered to call patient advocacy for the patient's family- CM called patient advocacy and spoke with Cayetano Jordanica and asked them to meet with the patient and family about concerns- RN and charge RN notified of family concerns. MARKY Flowers Care Management Interventions PCP Verified by CM: Yes(The patient does not have PCP- CM provided a list of [facility] physicians) Mode of Transport at Discharge: Other (see comment)(Family vs. BLS dependent upon functioning ) Transition of Care Consult (CM Consult): Discharge Planning, Other MyChart Signup: No 
Discharge Durable Medical Equipment: No 
Health Maintenance Reviewed: Yes Physical Therapy Consult: Yes Occupational Therapy Consult: Yes Speech Therapy Consult: No 
Current Support Network: Lives with Spouse, Own Home Confirm Follow Up Transport: Family Plan discussed with Pt/Family/Caregiver: Yes [employer] Information Provided?: No 
Discharge Location Discharge Placement: Unable to determine at this time(Patient has PT/OT evaluations pending)

## 2019-02-06 NOTE — CONSULTS
Esteban Junior Name:  Jane Jefferson 
MR#:  [id number] :  1985 ACCOUNT #:  [de-identified] DATE OF SERVICE:  2019 CHIEF COMPLAINT:  Back pain. HISTORY OF PRESENT ILLNESS:  The patient is a 30-year-old 
gentleman with past medical history of chronic low back 
pain, who presents to the hospital with significant back 
pain and right leg weakness. The patient reports that he 
has been experiencing some back pain for about a month or 
so. He reports that he has had some chronic issues with his 
back and about a few weeks ago he moved and he was lifting 
stuff. Today, he reports that sometimes he gets numbness in 
his right leg and also gets \"weakness in his foot. \"  Today, 
he was walking his dog and went to bend down to  his 
poop when he had sudden onset of severe back pain. The 
patient also reports that his right foot \"stopped working. \" The patient reports that he had to walk on the ball of his 
foot and got back to the house. He reports that sometimes 
when this happens and he sits on the toilet it resolves the 
symptoms. The patient reports that he sat on the toilet and 
then started having significant back pain to the point where 
he started crying. He reports that he started having 
numbness, tingling in his right leg. He reports that he 
could not have any sensation up until the knee on his right 
side. The patient got concerned and decided to come to the 
hospital.  The patient went to [facility] and had an MRI 
done which showed disk herniation in the lumbar spine and 
the patient was transferred to [facility] for 
further management and evaluation. The patient reports that 
his symptoms have gotten somewhat worse. He now has some 
tingling and numbness and decreased sensation in the right 
thigh which was not there. The patient also reports some 
right foot drop. The patient also reports that he has some numbness and tingling in his buttock and has decreased 
sensation in his buttock. The patient reports that besides 
this he does not have any other complaints or problems. Denies any headache, blurry vision, sore throat, trouble 
swallowing, trouble with speech, any chest pain, shortness 
of breath, cough, fever, chills, urine symptoms, focal or 
generalized neurological weakness besides what is mentioned 
above, recent travels, sick contacts, falls, injuries, 
hematemesis, melena, hemoptysis, hematuria, or any other 
concerns or problems. PAST MEDICAL HISTORY:  See above. HOME MEDICATIONS:  Currently, the patient is on no home 
medications. SOCIAL HISTORY:  Denies tobacco abuse, alcohol use, IV drug 
abuse. The patient was an athlete. ALLERGIES:  NO KNOWN DRUG ALLERGIES. FAMILY HISTORY:  Discussed, was found to be noncontributory 
to the present admission. REVIEW OF SYSTEMS:  All systems are reviewed and are found 
to be essentially negative except for the symptoms as 
mentioned above. PHYSICAL EXAMINATION: 
GENERAL:  Alert x3, awake, severely distressed, pleasant 
male who appears his stated age. VITAL SIGNS:  Temperature 98.7, pulse 101, respiratory rate 20, blood pressure 165/75, pulse oximetry 95% on room air. HEENT:  Pupils are equal and reactive to light. Dry mucous 
membranes. Tympanic membranes clear. NECK:  Supple. LUNGS:  Chest clear to auscultation bilaterally. COR:  S1, S2 heard. ABDOMEN:  Soft, nontender, and nondistended. Bowel sounds 
are physiological. 
EXTREMITIES:  The right lower extremity shows right 
footdrop. Significant weakness with 1/5 strength. Significantly decreased deep and superficial sensations up 
to the one-third distal thigh. Straight leg test positive 
at 30 degrees on the left side, good sensation on the left 
side, appears to have good strength 5/5 on the left side. NEURO/PSYCH:  Pleasant mood and affect.   Cranial nerves II 
 through XII gross intact. Sensory, see above. Motor, see 
above. Decreased reflexes right leg. SKIN:  Warm. MUSCULOSKELETAL:  The patient has significant tenderness in 
the L-spine radiating from L1 to S1. LABORATORY DATA:  White count 7.1, hemoglobin 16.4, 
hematocrit 48.2, and platelets 086. Urine shows no signs of 
infection. INR 1. Sodium 138, potassium 4.7, chloride 101, 
bicarb 36, anion gap 11, glucose 111. BUN is 14, creatinine 
0.97. Calcium 8.6. Bilirubin total 0.5, , AST 55, 
alkaline phosphatase 53. MRI of the lumbar spine shows L4-L5 mild disk space narrowing and desiccation. There is 
moderate canal disk herniation. There is some right 
paracentral disk extrusion with inferior migration. There 
is moderate spinal stenosis at the level of disk impingement 
on the right lateral recess. There is significant 
impingement of the right lateral recess at the L5 secondary 
to extruded disk, L5-S1 mild disk narrowing and desiccation, 
mild central and right paracentral disk bulge causing mild 
impingement of the right lateral recess. ASSESSMENT AND PLAN: 
1. Right footdrop, likely secondary to lumbar disk 
herniation. The patient will be admitted to our neuro 
floor, neurovascular checks being controlled and muscle 
relaxants. Neurosurgery has been consulted. Neurosurgery PA is evaluating the patient. We will keep n.p.o. for 
possible surgical intervention today. Closely monitor 
neurological status, any changes in neurological status, 
will mandate emergent intervention, continue to closely 
monitor for further intervention per hospital course. We 
will reassess as needed, provide optimal pain control. 2.  Elevated liver enzymes, unclear etiology. we will get Tylenol level and we will get a right upper quadrant 
ultrasound, unclear etiology, continue to closely monitor. We will repeat laboratories in the morning. 3.  Gastrointestinal and deep vein thrombosis prophylaxis, 
the patient will be on heparin. Pierce Denis MD 
 
 
[email or web address] 
D:  02/05/2019 16:36 
T:  02/05/2019 19:00 
JOB #:  [id number]

## 2019-02-06 NOTE — OP NOTES
[address]  
OPERATIVE REPORT Name:  León Momin 
MR#:  [id number] :  1985 ACCOUNT #:  [de-identified] DATE OF SERVICE:  2019 PREOPERATIVE DIAGNOSES:  Cauda equina syndrome and disk 
herniation L4-L5, right. POSTOPERATIVE DIAGNOSES:  Cauda equina syndrome and disk 
herniation L4-L5, right. PROCEDURE PERFORMED:  Lumbar laminectomy, diskectomy L4-L5 
right. SURGEON:  Gatito Ivory MD 
 
ASSISTANT: 
 
ANESTHESIA:  General. 
 
COMPLICATIONS:  None. SPECIMENS REMOVED:  Disk L4-L5, right. IMPLANTS:  None. ESTIMATED BLOOD LOSS:  Minimal. 
 
OPERATIVE PROCEDURE AND INDICATIONS:  This 81-year-old man 
had about a 1-month history of progressive right leg pain 
and back pain, and then today developed acute severe pain, 
weakness and numbness of the right leg. Imaging studies 
revealed a free fragment disk herniation at L4-L5 on the 
right and with developing progressive right leg weakness and 
numbness. I took him to the operating room urgently after 
discussing the risks, benefits, and alternatives of the 
surgery with him and his family. He was taken to the 
operating room where he was induced general endotracheal 
anesthesia, placed on the operating table, on the Verl Nailer 
table and chest rolls. He weighed 450 pounds, so this was 
the only table that we could utilize and it seemed to work 
fine. An x-ray was obtained prior to making the incision 
after placing a spinal needle, which was noted to be just 
between the L3 and L4 spinous processes, so the planned 
incision was planned accordingly. After sterile scrub, prep 
and drape, a timeout was performed to identify the correct 
patient and procedure. Appropriate antibiotics were 
administered prior to making the incision and sequential 
sleeves were applied for DVT prophylaxis.   A midline 
vertical incision was made, carried down to the paraspinal 
 muscle fascia. The muscle was taken out of subperiosteal 
fascia and off L4-L5. A self-retaining retractor was 
inserted into the edges of the rather deep incision. A 
small hemilaminectomy was performed in order to place a Penfield 4 over the area of the disk space and an x-ray was 
obtained to confirm position over L4-L5. The laminectomy 
was completed. The ligamentum flavum was removed. A 
generous foraminotomy over the L5 root was performed in 
order to decompress the root. It was obvious that there was 
a fair amount of compression on the under surface of the 
dura and the nerve root. I was able to place a Dull nerve 
hook under the L5 root and retrieved one large free fragment 
corresponding to the imaging studies of disk material, and 
then utilizing an upbiting pituitary rongeur, the second 
free fragment of disk material was retrieved from under the 
L5 root and the thecal sac. The disk space itself was then 
examined and it was noted to be herniated. It was incised 
with a #15 blade scalpel and multiple fragments of disk 
material were retrieved with pituitary rongeurs until the 
nerve root and the whole thecal sac was seemed to occupy 
more normal anatomical shape and position and no longer any 
free fragments of disk material were visible. The nerve was 
felt to be well decompressed, the area was irrigated 
copiously with antibiotic irrigation. Some Floseal was 
placed over the laminectomy site and irrigated again. Bleeding was minimal.  The facia was closed with 0 Vicryl, 
the subcutaneous tissue with 0 Vicryl, the subcuticular 
layer with 3-0 interrupted inverted Vicryl sutures and a 
running 4-0 subcuticular Monocryl under the skin. A sterile 
dressing was applied. The needle, sponge and instrument 
count were correct at the end of the procedure. FINDINGS: 
 
 
MD MADDIE [email or web address] 
D:  02/05/2019 23:16 
T:  02/06/2019 3:55 JOB #:  [id number] CC:  Mirna Denis MD

## 2019-02-06 NOTE — ANESTHESIA PREPROCEDURE EVALUATION
Anesthetic History No history of anesthetic complications Review of Systems / Medical History Patient summary reviewed, nursing notes reviewed and pertinent labs reviewed Pulmonary Within defined limits Smoker Neuro/Psych Within defined limits Cardiovascular Within defined limits GI/Hepatic/Renal 
Within defined limits Endo/Other Within defined limits Morbid obesity Other Findings Physical Exam 
 
Airway Mallampati: I 
TM Distance: > 6 cm Neck ROM: normal range of motion Mouth opening: Normal 
 
 Cardiovascular Regular rate and rhythm,  S1 and S2 normal,  no murmur, click, rub, or gallop Dental 
No notable dental hx Pulmonary Breath sounds clear to auscultation Abdominal 
GI exam deferred Other Findings Anesthetic Plan ASA: 2 Anesthesia type: general 
 
 
 
 
Induction: Intravenous Anesthetic plan and risks discussed with: Patient

## 2019-02-06 NOTE — PROGRESS NOTES
Problem: Pressure Injury - Risk of 
Goal: *Prevention of pressure injury Document Isaac Scale and appropriate interventions in the flowsheet. Outcome: Progressing Towards Goal 
Pressure Injury Interventions: 
Sensory Interventions: Assess changes in LOC, Check visual cues for pain, Keep linens dry and wrinkle-free, Minimize linen layers Activity Interventions: Increase time out of bed, Pressure redistribution bed/mattress(bed type), PT/OT evaluation Mobility Interventions: HOB 30 degrees or less, Pressure redistribution bed/mattress (bed type), PT/OT evaluation Nutrition Interventions: Document food/fluid/supplement intake

## 2019-02-06 NOTE — PROGRESS NOTES
Occupational Therapy EVALUATION with discharge Patient: Ynes Bernal (52 y.o. male) Date: 2/6/2019 Primary Diagnosis: Lumbar disc herniation [M51.26] Procedure(s) (LRB): SPINE LUMBAR LAMINECTOMY L4-5 RIGHT DISCECTOMY (Right) 1 Day Post-Op Precautions:  Back ASSESSMENT : 
Based on the objective data described below, the patient presents with POD 1 lumbar spine sx. He is at an overall min A level with LE ADLs and toileting as he has difficulty reaching his feet and buttocks following his back precautions due to his body habitus. Educated pt on on back safety and precautions and he verbalized good understanding. Pt's wife will be available to assist him at home. No further skilled acute intervention required at this time. Discharge Recommendations: None Further Equipment Recommendations for Discharge: toileting aides- educated pt on where available SUBJECTIVE:  
Patient stated I am ok and my wife will help me.  OBJECTIVE DATA SUMMARY:  
HISTORY:  
Past Medical History:  
Diagnosis Date  Cauda equina syndrome ([facility] [address] 2/5/2019  Nicotine vapor product user Past Surgical History:  
Procedure Laterality Date  HX MENISCECTOMY    
 left knee  HX ORTHOPAEDIC    
 ligament replacements in knees Prior Level of Function/Environment/Context: Independent, active, drive, works in [employer] Home Situation Home Environment: Apartment # Steps to Enter: 0 One/Two Story Residence: One story Living Alone: No 
Support Systems: Spouse/Significant Other/Partner Patient Expects to be Discharged to[de-identified] [id number] Current DME Used/Available at Home: None Tub or Shower Type: Tub/Shower combination Hand dominance: Right EXAMINATION OF PERFORMANCE DEFICITS: 
Cognitive/Behavioral Status: 
Neurologic State: Alert; Appropriate for age Orientation Level: Oriented X4 Cognition: Appropriate decision making; Appropriate for age attention/concentration; Appropriate safety awareness; Follows commands Perception: Appears intact Perseveration: No perseveration noted Safety/Judgement: Awareness of environment;Driving appropriateness; Fall prevention;Good awareness of safety precautions; Home safety; Insight into deficits Hearing: Auditory Auditory Impairment: None Vision/Perceptual:   
Acuity: Within Defined Limits Range of Motion: 
AROM: Within functional limits Strength: 
Strength: Within functional limits(except right dorsiflexion 3+/5) Coordination: 
  
Fine Motor Skills-Upper: Left Intact; Right Intact Gross Motor Skills-Upper: Left Intact; Right Intact Tone & Sensation: 
Tone: Normal 
Sensation: Impaired(right foot dorsum and latral aspect) Balance: 
Sitting: Intact Standing: Intact Functional Mobility and Transfers for ADLs:Bed Mobility: 
Rolling: Supervision Supine to Sit: Stand-by assistance Sit to Supine: Stand-by assistance Transfers: 
Sit to Stand: Modified independent Bathroom Mobility: Modified independent Toilet Transfer : Modified independent ADL Assessment: 
Feeding: Independent Oral Facial Hygiene/Grooming: Modified Independent(standing at sink) Bathing: Minimum assistance; Additional time;Assist x1(A to reach feet and effectively clean buttocks) Upper Body Dressing: Modified independent Lower Body Dressing: Minimum assistance(A to reach feet ) Toileting: Minimum assistance(to effectively clean buttocks) ADL Intervention and task modifications: 
Cognitive Retraining Safety/Judgement: Awareness of environment;Driving appropriateness; Fall prevention;Good awareness of safety precautions; Home safety; Insight into deficits Patient instructed and indicated understanding the benefits of maintaining activity tolerance, functional mobility, and independence with self care tasks during acute stay  to ensure safe return home and to baseline. Encouraged patient to increase frequency and duration OOB, not sitting longer than 30 mins without marching/walking with staff, be out of bed for all meals, perform daily ADLs (as approved by RN/MD regarding bathing etc), and performing functional mobility to/from bathroom. Patient instruction and indicated understanding on body mechanics, ergonomics and gravitational force on the spine during different body positions to plan activities in prep for return home to complete basic ADLs, instrumental ADLs and back to work safely. Patient instructed and demonstrated while supine hip ER stretch and hold 10 seconds to increase ROM in prep for lower body ADLs daily. Bathing: Patient instructed and indicated understanding when bathing to not submerge wound in water, stand to shower or sponge bathe, cover wound with plastic and tape to ensure no water reaches bandage/wound without cues. Dressing lower body: Patient instructed to don brace first and on the benefits to remain seated to don all clothing to increase independence with precautions and pain management. Patient instructed and demonstrated tailor sitting for lower body dressing. Toileting: Patient instructed on the benefits of using flushable wet wipes and toilet tongs if decreased reach or pain for barbara care. Also, the benefits of a reacher to aid in clothing management. Patient instruction and indicated understanding to not strain i.e. holding breath to bear down during a bowel movement, lifting/activity, and sexual activity. Home safety: Patient instructed and indicated understanding on home modifications and safety [raise height of ADL objects (i.e. clothing, sink items, fridge items, items to mouth when grooming), appropriate height of chair surfaces, recliner safety, change of floor surfaces, clear pathways] to increase independence and fall prevention.    
Standing: Patient instructed and indicated understanding to walk up to sink/counter top/surfaces, step into walker, square off while using objects, slide objects along surfaces, to increase adherence to back precautions and fall prevention. Patient instructed to increase amount of time standing in order to increase independence and tolerance with ADLs. During prolonged standing, can open cabinet door or place foot on stool to decrease spinal pressure/increase pain. Tub transfer: Patient instructed and indicated understanding regarding when it is safe to begin transfer into tub (complete stairs with PT, advance exercises with PT high enough to clear tub height, and while clothes donned practice with another person present). Functional Measure: 
Barthel Index: 
 
Bathin Bladder: 10 Bowels: 10 
Groomin Dressin Feeding: 10 Mobility: 10 Stairs: 5 Toilet Use: 5 Transfer (Bed to Chair and Back): 10 Total: 70 The Barthel ADL Index: Guidelines 1. The index should be used as a record of what a patient does, not as a record of what a patient could do. 2. The main aim is to establish degree of independence from any help, physical or verbal, however minor and for whatever reason. 3. The need for supervision renders the patient not independent. 4. A patient's performance should be established using the best available evidence. Asking the patient, friends/relatives and nurses are the usual sources, but direct observation and common sense are also important. However direct testing is not needed. 5. Usually the patient's performance over the preceding 24-48 hours is important, but occasionally longer periods will be relevant. 6. Middle categories imply that the patient supplies over 50 per cent of the effort. 7. Use of aids to be independent is allowed. Daphney Loco., Barthel, D.W. ([id number]). Functional evaluation: the Barthel Index. [facility] [address] [facility] (14)2.  
EVE McdonaldF, Carly Watters., Paige Bailon., Kia Gaspar. (1999). Measuring the change indisability after inpatient rehabilitation; comparison of the responsiveness of the Barthel Index and Functional Winkler Measure. Journal of Neurology, Neurosurgery, and Psychiatry, 66(4), 548-368. MAXIM Ng, ANGEL Jung, & Beatriz Gonzalez M.A. (2004.) Assessment of post-stroke quality of life in cost-effectiveness studies: The usefulness of the Barthel Index and the EuroQoL-5D. [facility], 13, 323-84 Occupational Therapy Evaluation Charge Determination History Examination Decision-Making LOW Complexity : Brief history review  MEDIUM Complexity : 3-5 performance deficits relating to physical, cognitive , or psychosocial skils that result in activity limitations and / or participation restrictions LOW Complexity : No comorbidities that affect functional and no verbal or physical assistance needed to complete eval tasks Based on the above components, the patient evaluation is determined to be of the following complexity level: LOW Pain: 
Pain Scale 1: Numeric (0 - 10) Pain Intensity 1: 0 Pain Location 1: Back Pain Orientation 1: Medial 
Pain Description 1: Dull Pain Intervention(s) 1: Rest;Relaxation technique Activity Tolerance:  
Good Please refer to the flowsheet for vital signs taken during this treatment. After treatment:  
[] Patient left in no apparent distress sitting up in chair 
[x] Patient left in no apparent distress in bed 
[x] Call bell left within reach [x] Nursing notified 
[] Caregiver present 
[] Bed alarm activated COMMUNICATION/EDUCATION:  
The patients plan of care was discussed with: Physical Therapist and Registered Nurse. [x] Home safety education was provided and the patient/caregiver indicated understanding. [x] Patient/family have participated as able in goal setting and plan of care. [x] Patient/family agree to work toward stated goals and plan of care. [] Patient understands intent and goals of therapy, but is neutral about his/her participation. [] Patient is unable to participate in goal setting and plan of care. This patients plan of care is appropriate for delegation to SIMIN. Thank you for this referral. 
Rosie Hair OT Time Calculation: 10 mins

## 2019-02-06 NOTE — PERIOP NOTES
TRANSFER - OUT REPORT: 
 
Verbal report given to 66Nia(name) on Allan Gómez  being transferred to [facility](unit) for routine post - op Report consisted of patients Situation, Background, Assessment and  
Recommendations(SBAR). Information from the following report(s) Kardex, Procedure Summary, Intake/Output and MAR was reviewed with the receiving nurse. Lines:  
Peripheral IV 02/05/19 Right Antecubital (Active) Site Assessment Clean, dry, & intact 2/5/2019 11:21 PM  
Phlebitis Assessment 0 2/5/2019 11:21 PM  
Infiltration Assessment 0 2/5/2019 11:21 PM  
Dressing Status Clean, dry, & intact 2/5/2019 11:21 PM  
Dressing Type Tape 2/5/2019 11:21 PM  
Hub Color/Line Status Pink;Capped 2/5/2019 11:21 PM  
Action Taken Open ports on tubing capped 2/5/2019  8:00 PM  
Alcohol Cap Used Yes 2/5/2019  8:00 PM  
   
Peripheral IV 02/05/19 Anterior;Distal;Left Hand (Active) Site Assessment Clean, dry, & intact 2/5/2019 11:21 PM  
Phlebitis Assessment 0 2/5/2019 11:21 PM  
Infiltration Assessment 0 2/5/2019 11:21 PM  
Dressing Status Occlusive 2/5/2019 11:21 PM  
Dressing Type Transparent 2/5/2019 11:21 PM  
Hub Color/Line Status Green; Infusing 2/5/2019 11:21 PM  
  
 
Opportunity for questions and clarification was provided. Patient transported with: 
 Registered Nurse

## 2019-02-06 NOTE — PROGRESS NOTES
I have reviewed discharge instructions with the patient and spouse. The patient and spouse verbalized understanding. Bedside RN performed patient education and medication education. Discharge concerns initiated and discussed with patient, including clarification on \"who\" assists the patient at their home and instructions for when the home going patient should call their provider after discharge. Opportunity for questions and clarification was provided. Patient receptive to education: YES Patient stated: \"I won't sit down for more than 30 minutes at a time. \" 
Barriers to Education: None Diagnosis Education given:  YES Length of stay: 1 Expected Day of Discharge: 2/6/19 Ask if they have \"Help at Home\" & add to white board? YES Education Day #: 1 Medication Education Given:  YES 
M in the box Medication name: Oxycodone Pt aware of HCAHPS survey: YES Patient discharged, to be driven home by wife. Discharge plans reviewed extensively, all questions answered.

## 2019-02-06 NOTE — PROGRESS NOTES
Bedside shift change report given to [address] [place] [address] (oncoming nurse) by Crys Bui RN (offgoing nurse).  Report included the following information SBAR, MAR, Recent Results and Cardiac Rhythm SR.

## 2019-02-06 NOTE — PROGRESS NOTES
Free fragment disc herniation L45 right with lack of sensation in entire right leg and weakness in entire right leg leg in this 450 lb young man. 1 month history of progressive leg, back pain and earlier today it became acutely worse  There are no other options to treat this other than surgery, and his size increases complication rate significantly including, among others, worsening of neuro deficit, bleeding, CSF leak and infection. He understands this and wishes to proceed Full note to follow

## 2019-02-06 NOTE — PROGRESS NOTES
Problem: Falls - Risk of 
Goal: *Absence of Falls Document Reino Horn Fall Risk and appropriate interventions in the flowsheet. Outcome: Progressing Towards Goal 
Fall Risk Interventions: 
Mobility Interventions: Bed/chair exit alarm, Communicate number of staff needed for ambulation/transfer, Patient to call before getting OOB, PT Consult for mobility concerns Medication Interventions: Bed/chair exit alarm, Patient to call before getting OOB, Teach patient to arise slowly Elimination Interventions: Call light in reach, Patient to call for help with toileting needs, Toileting schedule/hourly rounds Problem: Pressure Injury - Risk of 
Goal: *Prevention of pressure injury Document Isaac Scale and appropriate interventions in the flowsheet. Outcome: Progressing Towards Goal 
Pressure Injury Interventions: 
Sensory Interventions: Assess changes in LOC, Check visual cues for pain, Keep linens dry and wrinkle-free, Minimize linen layers Activity Interventions: Increase time out of bed, Pressure redistribution bed/mattress(bed type), PT/OT evaluation Mobility Interventions: HOB 30 degrees or less, Pressure redistribution bed/mattress (bed type), PT/OT evaluation Nutrition Interventions: Document food/fluid/supplement intake

## 2019-02-06 NOTE — PROGRESS NOTES
Bedside and Verbal shift change report given to Erlin RN (oncoming nurse) by Haseeb Redding RN (offgoing nurse).  Report included the following information SBAR, MAR, Recent Results and Cardiac Rhythm SR.

## 2019-02-06 NOTE — PROGRESS NOTES
Neurosurgery Spine Progress Note Yin Smith AGACNP-BC Work Cell: [phone] Post Op Day: 1 Day Post-Op February 6, 2019 8:22 AM  
 
Zev Washington HPI:  
  
 
Zev Washington is a 36 y.o. male without significant medical history who presented to the [place] [facility] ED with acute onset of back pain and right leg pain with numbness and weakness. He has actually had about a month of low back pain. Yesterday morning, he was bending over to  his dog's droppings when he stood up and had a sudden strange sensation in his back and then subsequent pain and weakness. He was unable to move his right ankle and had to hop on the ball of his foot back inside his house. He had numbness in his right foot which has progressievly worsened now involving his proximal right lower extremity. He denies any saddle anesthesia or bowel/bladder incontinence. MRI was obtained showing herniated discs at L4-5, with significant impingement of right lateral recess. The decision was made to transfer Mr. Alma Wu to [facility] for urgent neurosurgical evaluation. After a discussion of the risks, benefits, and alternatives, Zev Washington consented to undergo a  Procedure(s): SPINE LUMBAR LAMINECTOMY L4-5 RIGHT DISCECTOMY Subjective  
  
Patient reports improved function in RLE. Still with dorsiflexion weakness on the right. Residual numbness noted on dorsal aspect of right foot. Surgical pain currently managed. No radicular pain. He denies h/a,dizziness,cp,sob, cough,a bd pain, f/c, or n/v. 
 
  
 
 
  
Physical Exam: 
General:  Alert and oriented. No acute distress. Respiratory:  Breathing unlabored. Lungs CTA bilaterally Abdomen:  
Extremities: Obese, soft, non-tender No evidence of swelling or cyanosis Neurologic: 
 
 
Skin: 
  
   Speech fluent. Face symmetric. Fund of knowledge intact. Full strength/rom in BUE. Garber/fc. Reduced sensation to right foot. R EHL 4-/5, R DF 3/5 R PF 4-/5. 5/5 strength in LLE. Gait deferred No rashes/lesions. Incision c/d/i. No swelling/drainage  
     
 
   
 
 
  
 
Vital Signs:   
Patient Vitals for the past 8 hrs: 
 BP Temp Pulse Resp SpO2 Weight  
19 0600 128/65 98.1 °F (36.7 °C) 88 18 97 %   
19 0200 162/56 97.9 °F (36.6 °C) 98 23 97 % (!) 193.6 kg (426 lb 12.8 oz) 19 0032 119/77 97.9 °F (36.6 °C) 90 18 94 %  Temp (24hrs), Av.1 °F (36.7 °C), Min:97.4 °F (36.3 °C), Max:98.7 °F (37.1 °C) Intake/Output: 
No intake/output data recorded.  1901 -  0700 In: 2200 [I.V.:2200] Out: 1500 [RFNTL:1481] Pain Control:  
Pain Assessment Pain Scale 1: Numeric (0 - 10) Pain Intensity 1: 5 Pain Onset 1: acute Pain Location 1: Back Pain Orientation 1: Medial 
Pain Description 1: Dull Pain Intervention(s) 1: Rest, Relaxation technique LAB:   
Recent Labs 19 
2554 HCT 43.1 HGB 14.3 Lab Results Component Value Date/Time Sodium 137 2019 02:29 AM  
 Potassium 4.2 2019 02:29 AM  
 Chloride 104 2019 02:29 AM  
 CO2 23 2019 02:29 AM  
 Glucose 142 (H) 2019 02:29 AM  
 BUN 18 2019 02:29 AM  
 Creatinine 1.02 2019 02:29 AM  
 Calcium 8.0 (L) 2019 02:29 AM  
 
 
PT/OT:  
Gait:    
            
 
 
Assessment/Plan  
  
1. Incomplete SYL due to L4-5 HNP, 1 Day Post-Op Procedure(s): SPINE LUMBAR LAMINECTOMY L4-5 RIGHT DISCECTOMY 
 -Continue PT/OT 
 -Pain control- d/c IV dilaudid. PRN oxycodone, PRN flexeril 
 -d/c sanders once up moving 
 -Incentive Spirometer 
 -Tolerating diet without n/v 
 -VTE Prophylaxes - TEDS & SCDs 2. Elevated LFTs 
 -RUQ ultrasound indicative of hepatic steatosis but otherwise unrevealing 
 -acetaminophen level normal 
 -furthur workup per hospitalist 
 
Plan above discussed with Dr. Lucy Traylor Discharge To:  Pending PT/OT Signed By: Nate Antony NP Remarkable improvement this soon after surgery, considering how weak he was pre op  Perhaps he may only need outpt PT instead of rehab. I'm at another hospital today so i've discussed care with NP and agree with above

## 2019-02-06 NOTE — CONSULTS
[facility]  Aleksandar [facility] 
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 
player, [place], who has a large free 
fragment disk herniation L4-5 on the right with significant 
weakness in multiple muscle groups of the right leg and 
decreased sensation throughout the entire right leg. HISTORY OF PRESENT ILLNESS:  The patient states he had 
backache for a year. He has been told that because of the 
deadlifts and other exercises and sports that he has played 
and because of his weight, which is currently he states at 
450 pounds, he has had back pain and leg pain for a quite 
some time. He has had acute back pain and leg pain, 
particularly on the right leg over the last month. Today, 
when he went out to walk the dogs and apparently he was 
picking up dog waste, after walking the dogs, he suddenly 
felt another pop, although about a week ago, he felt some 
acute back pain and was having intermittent severe pain 
since then, worse since this morning. He went to an outside 
emergency room where they contacted me and I had him sent to 
the emergency room at [facility]. An MRI had been 
performed, it revealed a free fragment disk herniation at L4-5 particularly on the right side. He denies any urinary 
incontinence or urinary retention. He has been taking 
diclofenac. ALLERGIES:  HE HAS NO KNOWN DRUG ALLERGIES. REVIEW OF SYSTEMS:  Otherwise noncontributory. PHYSICAL EXAMINATION: 
VITAL SIGNS:  On admission, blood pressure 140/103. He 
admits that he is noncompliant with antihypertensive 
medications. He has been told that he needs to take it but 
at this time has no primary care doctor and is not taking 
medications for it. Pulse rate on admission was 104, 
temperature 97.4, respiratory rate 24. NEUROLOGIC:  Cranial nerve function II-XII normal.  He does 
not appear to be in any acute distress. Speech is fluent. Mood and affect are normal. 
EXTREMITIES:  No focal motor or sensory deficits of the 
upper extremities. In the lower extremities, he has 
weakness in almost all muscle groups of the right lower 
extremity. He can flex barely at the knee. He can extend 
at the knee about 1-2/5 strength. He has no dorsi or 
plantar flexion of the right foot. He has decreased 
sensation extending from the foot all the way up to the 
upper thigh. The toes are downgoing. There is no clonus at 
the ankles. I reviewed the imaging studies. Apparently, the emergency 
room doctor had tried to text me via what is called [employer]. I do not have [employer], but he was able to call 
the office and I spoke to him directly earlier today. I 
reviewed the MRI. The patient really does not have much of 
a  choice. He need an operation. I am not sure if we have 
an operating table that is large enough for him. The OR is 
checking into that but they assured me that we can make some 
accommodations although some tables may be better than 
others. He understands that there is an increased risk 
because of its size including an increased risk of CSF leak, 
progression or worsening of neurologic deficit, infection 
and bleeding among others, but I recommended lumbar 
laminectomy and diskectomy. I described the risks, benefits 
and alternatives and he wishes to proceed. I have already 
called the operating room and they are preparing a room for 
him. Nasir Marquez MD 
 
 
[id number] 
D:  02/05/2019 20:21 
T:  02/06/2019 1:59 
JOB #:  [id number] CC:  Edd Fonseca MD

## 2019-02-06 NOTE — ANESTHESIA POSTPROCEDURE EVALUATION
Procedure(s): SPINE LUMBAR LAMINECTOMY L4-5 RIGHT DISCECTOMY. Anesthesia Post Evaluation Patient location during evaluation: PACU Patient participation: complete - patient participated Level of consciousness: awake and alert Pain management: adequate Airway patency: patent Anesthetic complications: no 
Cardiovascular status: acceptable Respiratory status: acceptable Hydration status: acceptable Comments: I have seen and evaluated the patient and is ready for discharge. Endy Anne MD 
 
Post anesthesia nausea and vomiting:  none Visit Vitals /77 (BP 1 Location: Left arm, BP Patient Position: At rest) Pulse 90 Temp 36.6 °C (97.9 °F) Resp 18 Ht 6' 4\" (1.93 m) Wt (!) 190.7 kg (420 lb 6.7 oz) SpO2 94% BMI 51.17 kg/m²

## 2019-02-08 NOTE — CONSULTS
[address] Name:  Reagan Pollard 
MR#:  [id number] :  1985 ACCOUNT #:  [de-identified] DATE OF SERVICE:  2019 CHIEF COMPLAINT:  Back pain. HISTORY OF PRESENT ILLNESS:  The patient is a 29-year-old gentleman with past medical 
history of chronic low back pain, who presents to the hospital with significant back 
pain and right leg weakness. The patient reports that he has been experiencing some 
back pain for about a month or so. He reports that he has had some chronic issues 
with his back and about a few weeks ago he moved and he was lifting stuff. Today, he 
reports that sometimes he gets numbness in his right leg and also gets \"weakness in 
his foot. \"  Today, he was walking his dog and went to bend down to  his poop 
when he had sudden onset of severe back pain. The patient also reports that his 
right foot \"stopped working. \"  The patient reports that he had to walk on the ball of 
his foot and got back to the house. He reports that sometimes when this happens and 
he sits on the toilet it resolves the symptoms. The patient reports that he sat on 
the toilet and then started having significant back pain to the point where he 
started crying. He reports that he started having numbness, tingling in his right 
leg. He reports that he could not have any sensation up until the knee on his right 
side. The patient got concerned and decided to come to the hospital.  The patient 
went to [facility] and had an MRI done which showed disk herniation in the lumbar 
spine and the patient was transferred to [facility] for further management 
and evaluation. The patient reports that his symptoms have gotten somewhat worse. He now has some tingling and numbness and decreased sensation in the right thigh 
which was not there. The patient also reports some right foot drop. The patient also reports that he has some numbness and tingling in his buttock and has decreased 
sensation in his buttock. The patient reports that besides this he does not have any 
other complaints or problems. Denies any headache, blurry vision, sore throat, 
trouble swallowing, trouble with speech, any chest pain, shortness of breath, cough, 
fever, chills, urine symptoms, focal or generalized neurological weakness besides 
what is mentioned above, recent travels, sick contacts, falls, injuries, hematemesis, 
melena, hemoptysis, hematuria, or any other concerns or problems. PAST MEDICAL HISTORY:  See above. HOME MEDICATIONS:  Currently, the patient is on no home medications. SOCIAL HISTORY:  Denies tobacco abuse, alcohol use, IV drug abuse. The patient was 
an athlete. ALLERGIES:  NO KNOWN DRUG ALLERGIES. FAMILY HISTORY:  Discussed, was found to be noncontributory to the present admission. REVIEW OF SYSTEMS:  All systems are reviewed and are found to be essentially negative 
except for the symptoms as mentioned above. PHYSICAL EXAMINATION: 
GENERAL:  Alert x3, awake, severely distressed, pleasant male who appears his stated 
age. VITAL SIGNS:  Temperature 98.7, pulse 101, respiratory rate 20, blood pressure 165/75, pulse oximetry 95% on room air. HEENT:  Pupils are equal and reactive to light. Dry mucous membranes. Tympanic 
membranes clear. NECK:  Supple. LUNGS:  Chest clear to auscultation bilaterally. COR:  S1, S2 heard. ABDOMEN:  Soft, nontender, and nondistended. Bowel sounds are physiological. 
EXTREMITIES:  The right lower extremity shows right footdrop. Significant weakness 
with 1/5 strength. Significantly decreased deep and superficial sensations up to the 
one-third distal thigh. Straight leg test positive at 30 degrees on the left side, 
good sensation on the left side, appears to have good strength 5/5 on the left side. NEURO/PSYCH:  Pleasant mood and affect. Cranial nerves II through XII gross intact. Sensory, see above. Motor, see above. Decreased reflexes right leg. SKIN:  Warm. MUSCULOSKELETAL:  The patient has significant tenderness in the L-spine radiating 
from L1 to S1. LABORATORY DATA:  White count 7.1, hemoglobin 16.4, hematocrit 48.2, and platelets 242. Urine shows no signs of infection. INR 1. Sodium 138, potassium 4.7, chloride 101, bicarb 36, anion gap 11, glucose 111. BUN is 14, creatinine 0.97. Calcium 8.6. Bilirubin total 0.5, , AST 55, alkaline phosphatase 53. MRI of the lumbar 
spine shows L4-L5 mild disk space narrowing and desiccation. There is moderate canal 
disk herniation. There is some right paracentral disk extrusion with inferior 
migration. There is moderate spinal stenosis at the level of disk impingement on the 
right lateral recess. There is significant impingement of the right lateral recess 
at the L5 secondary to extruded disk, L5-S1 mild disk narrowing and desiccation, mild 
central and right paracentral disk bulge causing mild impingement of the right 
lateral recess. ASSESSMENT AND PLAN: 
1. Right footdrop, likely secondary to lumbar disk herniation. The patient will be 
admitted to our neuro floor, neurovascular checks being controlled and muscle 
relaxants. Neurosurgery has been consulted. Neurosurgery PA is evaluating the 
patient. We will keep n.p.o. for possible surgical intervention today. Closely 
monitor neurological status, any changes in neurological status, will mandate 
emergent intervention, continue to closely monitor for further intervention per 
hospital course. We will reassess as needed, provide optimal pain control. 2.  Elevated liver enzymes, unclear etiology. we will get Tylenol level and we will 
get a right upper quadrant ultrasound, unclear etiology, continue to closely monitor. We will repeat laboratories in the morning. 3.  Gastrointestinal and deep vein thrombosis prophylaxis, the patient will be on 
heparin. Ángel Akers MD [id number] 
D:  02/05/2019 16:36 
T:  02/08/2019 13:54 
JOB #:  [id number]

## 2019-02-08 NOTE — CONSULTS
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 HISTORY OF PRESENT ILLNESS:  The patient states he had backache for a year. He has  been told that because of the deadlifts and other exercises and sports that he has  played and because of his weight, which is currently he states at 450 pounds, he has  had back pain and leg pain for a quite some time. He has had acute back pain and leg  pain, particularly on the right leg over the last month. Today, when he went out to  walk the dogs and apparently he was picking up dog waste, after walking the dogs, he  suddenly felt another pop, although about a week ago, he felt some acute back pain  and was having intermittent severe pain since then, worse since this morning. He  went to an outside emergency room where they contacted me and I had him sent to the  emergency room at [facility]. An MRI had been performed, it revealed a free fragment  disk herniation at L4-5 particularly on the right side. He denies any urinary  incontinence or urinary retention. He has been taking diclofenac. ALLERGIES:  HE HAS NO KNOWN DRUG ALLERGIES. REVIEW OF SYSTEMS:  Otherwise noncontributory. PHYSICAL EXAMINATION:  VITAL SIGNS:  On admission, blood pressure 140/103. He admits that he is  noncompliant with antihypertensive medications. He has been told that he needs to  take it but at this time has no primary care doctor and is not taking medications for  it. Pulse rate on admission was 104, temperature 97.4, respiratory rate 24.   NEUROLOGIC:  Cranial nerve function II-XII normal.  He does not appear to be in any  acute distress. Speech is fluent. Mood and affect are normal.  EXTREMITIES:  No focal motor or sensory deficits of the upper extremities. In the  lower extremities, he has weakness in almost all muscle groups of the right lower  extremity. He can flex barely at the knee. He can extend at the knee about 1-2/5  strength. He has no dorsi or plantar flexion of the right foot. He has decreased  sensation extending from the foot all the way up to the upper thigh. The toes are  downgoing. There is no clonus at the ankles. I reviewed the imaging studies. Apparently, the emergency room doctor had tried to  text me via what is called [employer]. I do not have [employer], but he was able to  call the office and I spoke to him directly earlier today. I reviewed the MRI. The  patient really does not have much of a  choice. He need an operation. I am not sure  if we have an operating table that is large enough for him. The OR is checking into  that but they assured me that we can make some accommodations although some tables  may be better than others. He understands that there is an increased risk because of  its size including an increased risk of CSF leak, progression or worsening of  neurologic deficit, infection and bleeding among others, but I recommended lumbar  laminectomy and diskectomy. I described the risks, benefits and alternatives and he  wishes to proceed. I have already called the operating room and they are preparing a  room for him.         Brigido Velazquez MD      [id number]  D:  02/05/2019 20:21  T:  02/08/2019 13:50  JOB #:  [id number]  CC:  Ygnacio Bernheim, MD

## 2019-02-12 NOTE — DISCHARGE SUMMARY
Discharge Summary       PATIENT ID: Garrett Dorsey  MRN: [id number]   YOB: 1985    DATE OF ADMISSION: 2/5/2019  8:22 AM    DATE OF DISCHARGE:2/6/2019  PRIMARY CARE PROVIDER: None     ATTENDING PHYSICIAN: Therese Gomez MD      DISCHARGING PROVIDER: Therese Gomez MD    To contact this individual call [phone] and ask the  to page. If unavailable ask to be transferred the Adult Hospitalist Department. CONSULTATIONS: None    PROCEDURES/SURGERIES: Procedure(s):  SPINE LUMBAR LAMINECTOMY L4-5 RIGHT DISCECTOMY    ADMITTING DIAGNOSES & HOSPITAL COURSE:   36  Y.o M with PMH of obesity came to the hospital because of acute onset of back pain and right lower pain,weakness with numbness/tingling. Patient had low back pain for few months but on the day of admission he was walking his dog and then bent to remove the dog poop and he felt a pop in his back and later had progressively worsening numbness and weakness of the RLE. He did not have any bowel or bladder problems. Neurosurgery was consulted and he underwent spinal lumbar laminectomy L4-5 rt discectomy by . After the surgery,patient was able to walk but had minimal residual numbness. PT viet done - recommended OP physical therapy. Patient was given a prescription for flexeril and Oxycodone. He will need OP neurosurgery follow up. Recommended weight loss and to establish primary care. As he had mild elevation of liver enzymes ALT//55 - USG abdomen done which showed echogenic liver due to hepatic steatosis or other parenchymal disease. Will need repeat labs as OP and follow up. Hospital problems:   Cauda equina syndrome and disk herniation L4-L5, right. Obesity   Elevated liver enzymes    Procedure: Lumbar laminectomy, diskectomy L4-L5  right. MRI lumbar spine:    FINDINGS:     There is normal alignment of the lumbar spine. Vertebral body heights are  maintained.  Marrow signal is normal.     The conus medullaris terminates at L1. Signal and caliber of the distal spinal  cord are within normal limits.      The paraspinal soft tissues are within normal limits.     Lower thoracic spine: No herniation or stenosis.     L1-L2: No herniation or stenosis.     L2-L3: No herniation or stenosis.     L3-L4: No herniation or stenosis.     L4-L5: Mild disc space narrowing and desiccation. There is a moderate central  disc herniation. There is a superimposed right paracentral disc extrusion with  inferior migration. There is moderate spinal stenosis at the level of the disc  with greater impingement on the right lateral recess. There is significant  impingement on the right lateral recess at the L5 level secondary to the  extruded disc. There is mild bilateral neural foraminal narrowing.     L5-S1: Mild disc space narrowing and desiccation. Mild central and right  paracentral disc bulge causing mild impingement on the right lateral recess. There is moderate right and mild left neural foraminal narrowing.     IMPRESSION  IMPRESSION:  Spondylosis at L4-5 and L5-S1 as above. The liver is moderately echogenic. There is no mass or other focal abnormality. Portal venous flow is normal.       The gallbladder demonstrates no stones. There may be a trace of sludge in the  gallbladder. There is no wall thickening or fluid around the gallbladder. There  is no biliary duct dilatation and the common duct measures 4 mm in diameter.      The visualized portion of the pancreas is unremarkable.       The right kidney demonstrates normal echogenicity. There is  no solid mass,  stone or hydronephrosis. The right kidney measures 13.0 cm. The visualized portion of the IVC is unremarkable.     IMPRESSION  IMPRESSION:      1. Echogenic liver which may be due to hepatic steatosis or other parenchymal  disease. No focal liver lesions seen although evaluation is suboptimal due to  reduced penetration and inability to suspend respiration.   2. No gallstones or biliary dilatation. Shelia Benítez PENDING TEST RESULTS:   At the time of discharge the following test results are still pending: none      FOLLOW UP APPOINTMENTS:    Follow-up Information     Follow up With Specialties Details Why Contact Info    Daryn Mehta MD Neurosurgery Schedule an appointment as soon as possible for a visit in 10 days For wound re-check [address]  [phone]      None    None (395) Patient stated that they have no PCP      Daryn Mehta MD Neurosurgery In 10 days  [address]  [phone]             ADDITIONAL CARE RECOMMENDATIONS:   1)Follow up with  in 10 days  2)Make an appointment with PCP to establish care  3)Follow up at outpatient rehab therapy  4) As you have slightly elevated liver enzymes - we did an ultrasound of your liver,it showed fatty liver . Please get repeat labs in 2-3 weeks. DIET:regular     ACTIVITY: Activity as tolerated    WOUND CARE: na    EQUIPMENT needed: na        DISCHARGE MEDICATIONS:  Discharge Medication List as of 2/6/2019 12:52 PM      START taking these medications    Details   naloxone (NARCAN) 4 mg/actuation nasal spray Use 1 spray intranasally, then discard. Repeat with new spray every 2 min as needed for opioid overdose symptoms, alternating nostrils. , Print, Disp-2 Each, R-0         CONTINUE these medications which have CHANGED    Details   acetaminophen (TYLENOL) 325 mg tablet Take 2 Tabs by mouth every six (6) hours as needed for Pain., OTC      cyclobenzaprine (FLEXERIL) 10 mg tablet Take 1 Tab by mouth three (3) times daily as needed for Muscle Spasm(s). , Print, Disp-30 Tab, R-0      oxyCODONE IR (ROXICODONE) 5 mg immediate release tablet Take 1 Tab by mouth every four (4) hours as needed. Max Daily Amount: 30 mg., Print, Disp-40 Tab, R-0               NOTIFY YOUR PHYSICIAN FOR ANY OF THE FOLLOWING:   Fever over 101 degrees for 24 hours.    Chest pain, shortness of breath, fever, chills, nausea, vomiting, diarrhea, change in mentation, falling, weakness, bleeding. Severe pain or pain not relieved by medications. Or, any other signs or symptoms that you may have questions about. DISPOSITION:  X  Home With:   OT  PT  HH  RN       Long term SNF/Inpatient Rehab    Independent/assisted living    Hospice    Other:       PATIENT CONDITION AT DISCHARGE:     Functional status    Poor     Deconditioned    X Independent      Cognition   X  Lucid     Forgetful     Dementia      Catheters/lines (plus indication)    Lezama     PICC     PEG    X None      Code status   X  Full code     DNR      PHYSICAL EXAMINATION AT DISCHARGE:  Gen: he is a young gentle man who is not in distress  Resp : Clear to auscultation b/l  Cardiac: S 1, S2 wnl  Abdomen: soft,non tender,obese. Neuro: alert and oriented X 3, RLE 4/5, b/l UE and LLE 5/5,decresed sensation of the rt foot.       CHRONIC MEDICAL DIAGNOSES:  Problem List as of 2/6/2019 Date Reviewed: 2/5/2019          Codes Class Noted - Resolved    * (Principal) Lumbar disc herniation ICD-10-CM: M51.26  ICD-9-CM: 722.10  2/5/2019 - Present        Cauda equina syndrome ([facility] [address] ICD-10-CM: G83.4  ICD-9-CM: 344.60  2/5/2019 - Present              25 minutes were spent with the patient on counseling and coordination of care    Signed:   Abby Patel MD  2/12/2019  1:12 PM

## 2019-02-13 NOTE — H&P
[address] HISTORY AND PHYSICAL Name:  León Momin 
MR#:  [id number] :  1985 ACCOUNT #:  [de-identified] ADMIT DATE:  2019 CHIEF COMPLAINT:  Back pain. HISTORY OF PRESENT ILLNESS:  The patient is a 30-year-old gentleman with past medical 
history of chronic low back pain, who presents to the hospital with significant back 
pain and right leg weakness. The patient reports that he has been experiencing some 
back pain for about a month or so. He reports that he has had some chronic issues 
with his back and about a few weeks ago he moved and he was lifting stuff. Today, he 
reports that sometimes he gets numbness in his right leg and also gets \"weakness in 
his foot. \"  Today, he was walking his dog and went to bend down to  his poop 
when he had sudden onset of severe back pain. The patient also reports that his 
right foot \"stopped working. \"  The patient reports that he had to walk on the ball of 
his foot and got back to the house. He reports that sometimes when this happens and 
he sits on the toilet it resolves the symptoms. The patient reports that he sat on 
the toilet and then started having significant back pain to the point where he 
started crying. He reports that he started having numbness, tingling in his right 
leg. He reports that he could not have any sensation up until the knee on his right 
side. The patient got concerned and decided to come to the hospital.  The patient 
went to [facility] and had an MRI done which showed disk herniation in the lumbar 
spine and the patient was transferred to [facility] for further management 
and evaluation. The patient reports that his symptoms have gotten somewhat worse. He now has some tingling and numbness and decreased sensation in the right thigh 
which was not there. The patient also reports some right foot drop. The patient also reports that he has some numbness and tingling in his buttock and has decreased 
sensation in his buttock. The patient reports that besides this he does not have any 
other complaints or problems. Denies any headache, blurry vision, sore throat, 
trouble swallowing, trouble with speech, any chest pain, shortness of breath, cough, 
fever, chills, urine symptoms, focal or generalized neurological weakness besides 
what is mentioned above, recent travels, sick contacts, falls, injuries, hematemesis, 
melena, hemoptysis, hematuria, or any other concerns or problems. PAST MEDICAL HISTORY:  See above. HOME MEDICATIONS:  Currently, the patient is on no home medications. SOCIAL HISTORY:  Denies tobacco abuse, alcohol use, IV drug abuse. The patient was 
an athlete. ALLERGIES:  NO KNOWN DRUG ALLERGIES. FAMILY HISTORY:  Discussed, was found to be noncontributory to the present admission. REVIEW OF SYSTEMS:  All systems are reviewed and are found to be essentially negative 
except for the symptoms as mentioned above. PHYSICAL EXAMINATION: 
GENERAL:  Alert x3, awake, severely distressed, pleasant male who appears his stated 
age. VITAL SIGNS:  Temperature 98.7, pulse 101, respiratory rate 20, blood pressure 165/75, pulse oximetry 95% on room air. HEENT:  Pupils are equal and reactive to light. Dry mucous membranes. Tympanic 
membranes clear. NECK:  Supple. LUNGS:  Chest clear to auscultation bilaterally. COR:  S1, S2 heard. ABDOMEN:  Soft, nontender, and nondistended. Bowel sounds are physiological. 
EXTREMITIES:  The right lower extremity shows right footdrop. Significant weakness 
with 1/5 strength. Significantly decreased deep and superficial sensations up to the 
one-third distal thigh. Straight leg test positive at 30 degrees on the left side, 
good sensation on the left side, appears to have good strength 5/5 on the left side. NEURO/PSYCH:  Pleasant mood and affect. Cranial nerves II through XII gross intact. Sensory, see above. Motor, see above. Decreased reflexes right leg. SKIN:  Warm. MUSCULOSKELETAL:  The patient has significant tenderness in the L-spine radiating 
from L1 to S1. LABORATORY DATA:  White count 7.1, hemoglobin 16.4, hematocrit 48.2, and platelets 242. Urine shows no signs of infection. INR 1. Sodium 138, potassium 4.7, chloride 101, bicarb 36, anion gap 11, glucose 111. BUN is 14, creatinine 0.97. Calcium 8.6. Bilirubin total 0.5, , AST 55, alkaline phosphatase 53. MRI of the lumbar 
spine shows L4-L5 mild disk space narrowing and desiccation. There is moderate canal 
disk herniation. There is some right paracentral disk extrusion with inferior 
migration. There is moderate spinal stenosis at the level of disk impingement on the 
right lateral recess. There is significant impingement of the right lateral recess 
at the L5 secondary to extruded disk, L5-S1 mild disk narrowing and desiccation, mild 
central and right paracentral disk bulge causing mild impingement of the right 
lateral recess. ASSESSMENT AND PLAN: 
1. Right footdrop, likely secondary to lumbar disk herniation. The patient will be 
admitted to our neuro floor, neurovascular checks being controlled and muscle 
relaxants. Neurosurgery has been consulted. Neurosurgery PA is evaluating the 
patient. We will keep n.p.o. for possible surgical intervention today. Closely 
monitor neurological status, any changes in neurological status, will mandate 
emergent intervention, continue to closely monitor for further intervention per 
hospital course. We will reassess as needed, provide optimal pain control. 2.  Elevated liver enzymes, unclear etiology. we will get Tylenol level and we will 
get a right upper quadrant ultrasound, unclear etiology, continue to closely monitor. We will repeat laboratories in the morning. 3.  Gastrointestinal and deep vein thrombosis prophylaxis, the patient will be on 
heparin. Job MD MARIA DEL ROSARIO [email or web address] [email or web address] 
D:  02/05/2019 16:36 
T:  02/13/2019 17:41 JOB #:  [id number]

## 2019-02-13 NOTE — OP NOTES
[address] [place]  
OPERATIVE REPORT Name:  Amaury Collier 
MR#:  [id number] :  1985 ACCOUNT #:  [de-identified] DATE OF SERVICE:  2019 PREOPERATIVE DIAGNOSIS:  Cauda equina syndrome, herniated disc L4-L5. POSTOPERATIVE DIAGNOSIS:  Cauda equina syndrome, herniated disc L4-L5. PROCEDURE PERFORMED:  Right lumbar laminectomy, discectomy L4-L5. SURGEON:  Katherine Mandujano MD 
 
ASSISTANT: 
 
ANESTHESIA:  General. 
 
COMPLICATIONS:  There were no complications. SPECIMENS REMOVED:  Disc. IMPLANTS:  None. ESTIMATED BLOOD LOSS:  Minimal. 
 
PROCEDURE:  Review of the imaging studies revealed a large L4-L5 disc herniation in 
this patient that was presenting with progressive signs and symptoms of cauda equina 
syndrome and a severely weak entire right leg. He was taken to the operating room 
urgently where he was induced under general endotracheal anesthesia, placed on the 
operating table in the prone position, on chest rolls. The lumbar region was 
scrubbed, prepped and draped in the usual sterile fashion. A special bed had to be 
found because of the patient's large size. A midline vertical incision was made in 
the region of the L4-L5 interspace. The muscle was taken down of subperiosteal 
fascia on the right side. X-rays obtained to confirm position. A laminectomy at L4 
was performed using a combination of Kerrison and Leksell rongeurs. The ligamentum 
flavum was removed. A foraminotomy over the L5 root was performed under the sac and 
the nerve root, where multiple fragments of disc material that had herniated out of 
the disc space corresponding to the imaging studies, these were retrieved with 
pituitary rongeurs. The disc space itself was entered and further fragments of disc 
material were removed until no further fragments were visualized and the thecal sac 
and nerve root was seen to occupy a more normal anatomical shape and position.   The 
 area was irrigated copiously with antibiotic irrigation. The incision closed in 
layers. A sterile dressing applied and there were no complications. Blood loss was 
minimal.  The patient was taken to the recovery room in stable condition. Harjinder Sanchez MD 
 
 
[email or web address] 
D:  02/12/2019 16:11 
T:  02/13/2019 5:28 JOB #:  [id number] CC:  Rebekah Galicia MD

## 2019-02-13 NOTE — DISCHARGE INSTRUCTIONS
After Hospital Care Plan:  Discharge Instructions Lumbar Laminectomy Surgery Dr. Lisa Gramajo    Patient Name: Eugene Ribeiro    Date of procedure: 2/5/2019      Date of discharge: 2/6/2019    Procedure: Procedure(s):  SPINE LUMBAR LAMINECTOMY L4-5 RIGHT DISCECTOMY      PCP: None    Follow up appointments  -Follow up with Dr. Lisa Gramajo in 10-14 days. Call [phone] to make an appointment as soon as you get home from the hospital.    When to call your Spine Surgeon:  -Signs of infection-if your incision is red; continues to have drainage; drainage has a foul odor or if you have a persistent fever over 101 degrees for 24 hours  -Nausea or vomiting, severe headache  -Loss of bowel or bladder function, inability to urinate  -Changes in sensation in your arms or legs (numbness, tingling, loss of color)  -Increased weakness-greater than before your surgery  -Severe pain or pain not relieved by medications  -Signs of a blood clot in your leg-calf pain, tenderness, redness, swelling of lower leg    When to call your Primary Care Physician:  -Concerns about medical conditions such as diabetes, high blood pressure, asthma, congestive heart failure  -Call if blood sugars are elevated, persistent headache or dizziness, coughing or congestion, constipation or diarrhea, burning with urination, abnormal heart rate    When to call 431 and go to the nearest emergency room:  -Acute onset of chest pain, shortness of breath, difficulty breathing    Activity  - You are going home a well person, be as active as possible. Your only exercise should be walking. Start with short frequent walks and increase your walking distance each day.  -Limit the amount of time you sit to 20-30 minute intervals. Sitting for prolonged periods of time will be uncomfortable for you following surgery.  -Do NOT lift anything over 5 pounds  -Do NOT do any straining, twisting or bending  -When you are in bed, you may lay on your back or on either side.   Do NOT lie on your stomach    Diet  -Resume usual diet; drink plenty of fluids; eat foods high in fiber  -It is important to have regular bowel movements. Pain medications may cause constipation. You may want to take a stool softener (such as Senokot-S or Colace) to prevent constipation.  -If constipation occurs, take a laxative (such as Dulcolax tablets, Milk of Magnesia, or a suppository). Laxatives should only be used if the above preventable measures have failed and you still have not had a bowel movement after three days    Driving  -You may not drive or return to work until instructed by your physician. However, you may ride in the car for short periods of time. Incision Care  -You may take brief showers but do not run the water run directly onto the wound. After showering or bathing, remove the wet dressing and gently blot the wound dry with a soft towel.  -Do not rub or apply any lotions or ointments to your incision site.   -Do not soak or scrub your wound  -Keep a dry dressing (ABD and paper tape) on your incision and have it changed daily for 14 days after surgery; more often if your incision is draining. Have your caregiver wash their hands thoroughly before changing your dressing.  -You will have absorbable sutures and steristrips (white tape) on your incision. Leave the steristrips on until they fall off. Showering  -You may shower in approximately 2 days after your surgery.    -Leave the dressing on during your shower. Do NOT allow the water to run directly onto your dressing. Once you get out of the shower, put on a dry dressing.  -Reminder- your brace can be removed while showering. Remember to not bend or twist while your brace is off.    -Do not take a tub bath. Preventing blood clots  -You have been given T.E.D. stockings to wear. Continue to wear these for 7 days after your discharge.   Put them on in the morning and take them off at night.    -They are used to increase your circulation and prevent blood clots from forming in your legs  -T. E.D. stockings can be machine washed, temperature not to exceed 160° F (71°C) and machine dried for 15 to 20 minutes, temperature not to exceed 250° F (121°C). Pain management  -Take pain medication as prescribed; decrease the amount you use as your pain lessens  -Do not wait until you are in extreme pain to take your medication.  -Avoid alcoholic beverages while taking pain medication    Pain Medication Safety  DO:  -Read the Medication Guide   -Take your medicine exactly as prescribed   -Store your medicine away from children and in a safe place   -Flush unused medicine down the toilet   -Call your healthcare provider for medical advice about side effects. You may report side effects to FDA at 5-999-FDA-2086.   -Please be aware that many medications contain Tylenol. We do not want you to over medicate so please read the information below as a guide. Do not take more than 4 Grams of Tylenol in a 24 hour period. (There are 1000 milligrams in one Gram)                                                                                                                                                                                                                                                                                      Percocet contains 325 mg of Tylenol per tablet (do not take more than 12 tablets in 24 hours)  Lortab contains 500 mg of Tylenol per tablet (do not take more than 8 tablets in 24 hours)  Norco contains 325 mg of Tylenol per tablet (do not take more than 12 tablets in 24 hours). DO NOT:  -Do not give your medicine to others   -Do not take medicine unless it was prescribed for you   -Do not stop taking your medicine without talking to your healthcare provider   -Do not break, chew, crush, dissolve, or inject your medicine.  If you cannot  swallow your medicine whole, talk to your healthcare provider.  -Do not drink alcohol while taking this medicine  -Do not take anti-inflammatory medications or aspirin unless instructed by your  Physician. Discharge Instructions       PATIENT ID: Grecia Villalobos  MRN: [id number]   YOB: 1985    DATE OF ADMISSION: 2/5/2019  8:22 AM    DATE OF DISCHARGE: 2/6/2019    PRIMARY CARE PROVIDER: None     ATTENDING PHYSICIAN: Arden Gramajo MD  DISCHARGING PROVIDER: Makenzie Rod MD    To contact this individual call [phone] and ask the  to page. If unavailable ask to be transferred the Adult Hospitalist Department. DISCHARGE DIAGNOSES :  Cauda equina syndrome and disk herniation L4-L5, right.     PROCEDURE PERFORMED:  Lumbar laminectomy, diskectomy L4-L5  right. CONSULTATIONS: IP CONSULT TO NEUROSURGERY  IP CONSULT TO NEUROSURGERY  IP CONSULT TO HOSPITALIST    PROCEDURES/SURGERIES: Procedure(s):  SPINE LUMBAR LAMINECTOMY L4-5 RIGHT DISCECTOMY    PENDING TEST RESULTS:   At the time of discharge the following test results are still pending: none    Xr Spine Lumb 2 Or 3 V    Result Date: 2/5/2019  Impression: 1. No acute osseous abnormality. Mri Lumb Spine Wo Cont    Result Date: 2/5/2019  IMPRESSION: Spondylosis at L4-5 and L5-S1 as above. [address]    Result Date: 2/5/2019  IMPRESSION: 1. Echogenic liver which may be due to hepatic steatosis or other parenchymal disease. No focal liver lesions seen although evaluation is suboptimal due to reduced penetration and inability to suspend respiration. 2. No gallstones or biliary dilatation. [facility] MRI lumbar spine: There is normal alignment of the lumbar spine. Vertebral body heights are  maintained. Marrow signal is normal.    The conus medullaris terminates at L1. Signal and caliber of the distal spinal  cord are within normal limits. The paraspinal soft tissues are within normal limits. Lower thoracic spine: No herniation or stenosis. L1-L2: No herniation or stenosis.     L2-L3: No herniation or stenosis. L3-L4: No herniation or stenosis. L4-L5: Mild disc space narrowing and desiccation. There is a moderate central  disc herniation. There is a superimposed right paracentral disc extrusion with  inferior migration. There is moderate spinal stenosis at the level of the disc  with greater impingement on the right lateral recess. There is significant  impingement on the right lateral recess at the L5 level secondary to the  extruded disc. There is mild bilateral neural foraminal narrowing. L5-S1: Mild disc space narrowing and desiccation. Mild central and right  paracentral disc bulge causing mild impingement on the right lateral recess. There is moderate right and mild left neural foraminal narrowing. FOLLOW UP APPOINTMENTS:   Follow-up Information     Follow up With Specialties Details Why Contact Info    Pao Spring MD Neurosurgery Schedule an appointment as soon as possible for a visit in 10 days For wound re-check [address]  [phone]      None    None ([phone]) Patient stated that they have no PCP      Pao Spring MD Neurosurgery In 10 days  [address]  [phone]             ADDITIONAL CARE RECOMMENDATIONS:   1)Follow up with  in 10 days  2)Make an appointment with PCP to establish care  3)Follow up at outpatient rehab therapy  4) As you have slightly elevated liver enzymes - we did an ultrasound of your liver,it showed fatty liver . Please get repeat labs in 2-3 weeks. DIET:regular     ACTIVITY: Activity as tolerated    WOUND CARE: na    EQUIPMENT needed: na      DISCHARGE MEDICATIONS:   See Medication Reconciliation Form    · It is important that you take the medication exactly as they are prescribed. · Keep your medication in the bottles provided by the pharmacist and keep a list of the medication names, dosages, and times to be taken in your wallet.    · Do not take other medications without consulting your doctor. NOTIFY YOUR PHYSICIAN FOR ANY OF THE FOLLOWING:   Fever over 101 degrees for 24 hours. Chest pain, shortness of breath, fever, chills, nausea, vomiting, diarrhea, change in mentation, falling, weakness, bleeding. Severe pain or pain not relieved by medications. Or, any other signs or symptoms that you may have questions about.       DISPOSITION:   X Home With:   OT  PT  HH  RN       SNF/Inpatient Rehab/LTAC    Independent/assisted living    Hospice    Other:         Signed:   Makenzie Rod MD  2/6/2019  11:40 AM

## 2019-02-15 NOTE — OP NOTES
[address]   OPERATIVE REPORT    Name:  Tani Goodwin  MR#:  [id number]  :  1985  ACCOUNT #:  [de-identified]  DATE OF SERVICE:  2019    Amended document - corrected CSN; 2/15/19    PREOPERATIVE DIAGNOSIS:  Cauda equina syndrome, herniated disc L4-L5. POSTOPERATIVE DIAGNOSIS:  Cauda equina syndrome, herniated disc L4-L5. PROCEDURE PERFORMED:  Right lumbar laminectomy, discectomy L4-L5. SURGEON:  Jose Dinh MD    ASSISTANT: _____    ANESTHESIA:  General.    COMPLICATIONS:  There were no complications. SPECIMENS REMOVED:  Disc. IMPLANTS:  None. ESTIMATED BLOOD LOSS:  Minimal.    PROCEDURE:  Review of the imaging studies revealed a large L4-L5 disc herniation in  this patient that was presenting with progressive signs and symptoms of cauda equina  syndrome and a severely weak entire right leg. He was taken to the operating room  urgently where he was induced under general endotracheal anesthesia, placed on the  operating table in the prone position on chest rolls. The lumbar region was  scrubbed, prepped and draped in the usual sterile fashion. A special bed had to be  found because of the patient's large size. A midline vertical incision was made in the region of the L4-L5 interspace. The  muscle was taken down of subperiosteal fascia on the right side. X-rays obtained to  confirm position. A laminectomy at L4 was performed using a combination of Kerrison  and Leksell rongeurs. The ligamentum flavum was removed. A foraminotomy of the L5  root was performed under the sac and the nerve root, where multiple fragments of disc  material that had herniated out of the disc space corresponding to the imaging  studies, these were retrieved with pituitary rongeurs.   The disc space itself was  entered and further fragments of disc material were removed until no further  fragments were visualized and the thecal sac and nerve root was seen to occupy a more  normal anatomical shape and position. The area was irrigated copiously with  antibiotic irrigation. The incision closed in layers. A sterile dressing applied  and there were no complications. Blood loss was minimal.  The patient was taken to  the recovery room in stable condition.         Miguel Fernandez MD      [email or web address]  D:  02/12/2019 16:11  T:  02/15/2019 15:36  JOB #:  [id number]  CC:  Mirna Denis MD

## 2019-02-15 NOTE — OP NOTES
[address]   OPERATIVE REPORT    Name:  Meryle Harries  MR#:  [id number]  :  1985  ACCOUNT #:  [de-identified]  DATE OF SERVICE:  2019      Amended document - corrected CSN; 2/15/19    PREOPERATIVE DIAGNOSES:  Cauda equina syndrome and disk herniation L4-L5, right. POSTOPERATIVE DIAGNOSES:  Cauda equina syndrome and disk herniation L4-L5, right. PROCEDURE PERFORMED:  Lumbar laminectomy, diskectomy L4-L5 right. SURGEON:  Sherine Healy MD    ASSISTANT:  _____    ANESTHESIA:  General.    COMPLICATIONS:  None. SPECIMENS REMOVED:  Disk L4-L5, right. IMPLANTS:  None. ESTIMATED BLOOD LOSS:  Minimal.    OPERATIVE PROCEDURE AND INDICATIONS:  This 71-year-old man had about a 1-month  history of progressive right leg pain and back pain, and then today developed acute  severe pain, weakness and numbness of the right leg. Imaging studies revealed a free  fragment disk herniation at L4-L5 on the right and with developing progressive right  leg weakness and numbness. I took him to the operating room urgently after  discussing the risks, benefits, and alternatives of the surgery with him and his  family. He was taken to the operating room where he was induced general endotracheal  anesthesia, placed on the operating table, on the [place] table and chest rolls. He  weighed 450 pounds, so this was the only table that we could utilize and it seemed to  work fine. An x-ray was obtained prior to making the incision after placing a spinal  needle, which was noted to be just between the L3 and L4 spinous processes, so the  planned incision was planned accordingly. After sterile scrub, prep and drape, a  timeout was performed to identify the correct patient and procedure. Appropriate  antibiotics were administered prior to making the incision and sequential sleeves  were applied for DVT prophylaxis.   A midline vertical incision was made, carried down  to the paraspinal muscle fascia. The muscle was taken out of subperiosteal fascia  and off L4-L5. A self-retaining retractor was inserted into the edges of the rather  deep incision. A small hemilaminectomy was performed in order to place a [place] 4  over the area of the disk space and an x-ray was obtained to confirm position over  L4-L5. The laminectomy was completed. The ligamentum flavum was removed. A  generous foraminotomy over the L5 root was performed in order to decompress the root. It was obvious that there was a fair amount of compression on the under surface of  the dura and the nerve root. I was able to place a Dull nerve hook under the L5 root  and retrieved one large free fragment corresponding to the imaging studies of disk  material, and then utilizing an upbiting pituitary rongeur, the second free fragment  of disk material was retrieved from under the L5 root and the thecal sac. The disk  space itself was then examined and it was noted to be herniated. It was incised with  a #15 blade scalpel and multiple fragments of disk material were retrieved with  pituitary rongeurs until the nerve root and the whole thecal sac was seemed to occupy  more normal anatomical shape and position and no longer any free fragments of disk  material were visible. The nerve was felt to be well decompressed, the area was  irrigated copiously with antibiotic irrigation. Some Floseal was placed over the  laminectomy site and irrigated again. Bleeding was minimal.  The facia was closed  with 0 Vicryl, the subcutaneous tissue with 0 Vicryl, the subcuticular layer with 3-0  interrupted inverted Vicryl sutures and a running 4-0 subcuticular Monocryl under the  skin. A sterile dressing was applied. The needle, sponge and instrument count were  correct at the end of the procedure.     FINDINGS:      MD MADDIE Jeffery/[email or web address]  D:  02/05/2019 23:16  T:  02/15/2019 15:29  JOB #:  [id number]  CC:  Miki Hansen MD

## 2019-03-15 ENCOUNTER — OFFICE VISIT (OUTPATIENT)
Dept: INTERNAL MEDICINE CLINIC | Age: 34
End: 2019-03-15

## 2019-03-15 VITALS
HEIGHT: 76 IN | HEART RATE: 95 BPM | TEMPERATURE: 98.8 F | BODY MASS INDEX: 38.36 KG/M2 | RESPIRATION RATE: 18 BRPM | DIASTOLIC BLOOD PRESSURE: 79 MMHG | WEIGHT: 315 LBS | OXYGEN SATURATION: 97 % | SYSTOLIC BLOOD PRESSURE: 154 MMHG

## 2019-03-15 DIAGNOSIS — R73.01 ELEVATED FASTING BLOOD SUGAR: ICD-10-CM

## 2019-03-15 DIAGNOSIS — E66.01 MORBID OBESITY WITH BODY MASS INDEX (BMI) OF 50.0 TO 59.9 IN ADULT (HCC): ICD-10-CM

## 2019-03-15 DIAGNOSIS — Z00.00 PHYSICAL EXAM, ANNUAL: Primary | ICD-10-CM

## 2019-03-15 DIAGNOSIS — I10 ESSENTIAL HYPERTENSION: ICD-10-CM

## 2019-03-15 RX ORDER — OLMESARTAN MEDOXOMIL 20 MG/1
20 TABLET ORAL DAILY
Qty: 30 TAB | Refills: 3 | Status: SHIPPED | OUTPATIENT
Start: 2019-03-15

## 2019-03-15 NOTE — PROGRESS NOTES
HISTORY OF PRESENT ILLNESS  Sanjiv Barr is a 36 y.o. male presents to [facility] care   HPI  Emergency back surgery 5 weeks ago, when he presents to ED with progressive right side numbness    Reports Hx of back problems r/t football injuries     Long history of elevated blood pressure, since teenage. No treatment    Recurrent cough 5-6 years ago, stopped smoking  3 years ago            Psychosocial Hx:   No depression or anxiety  Drinks socially      No children   Works in sales for building exterior    Sleep well, does better with less sleep, usually 5-6 hours   Negative sleep study         Past Medical History:   Diagnosis Date    Cauda equina syndrome ([facility] [address] 2/5/2019    Nicotine vapor product user      Current Outpatient Medications on File Prior to Visit   Medication Sig Dispense Refill    acetaminophen (TYLENOL) 325 mg tablet Take 2 Tabs by mouth every six (6) hours as needed for Pain. No current facility-administered medications on file prior to visit. Past Surgical History:   Procedure Laterality Date    HX MENISCECTOMY      left knee    HX ORTHOPAEDIC      ligament replacements in knees       Family History   Problem Relation Age of Onset    No Known Problems Mother     Other Father         cerebral    Cancer Paternal Grandmother        Review of Systems   Constitutional: Positive for weight loss (intentional). HENT: Negative. Eyes: Negative for blurred vision. Cardiovascular: Negative for chest pain, palpitations and leg swelling. Gastrointestinal: Negative. Genitourinary: Negative. Musculoskeletal: Positive for joint pain. Negative for back pain. Skin: Negative. Neurological: Negative. Endo/Heme/Allergies: Negative. Psychiatric/Behavioral: Negative.       Visit Vitals  /79 (BP 1 Location: Right arm, BP Patient Position: Sitting)   Pulse 95   Temp 98.8 °F (37.1 °C) (Oral)   Resp 18   Ht 6' 4\" (1.93 m)   Wt (!) 415 lb 9.6 oz (188.5 kg)   SpO2 97%   BMI 50.59 kg/m²     Physical Exam   Constitutional: He is oriented to person, place, and time. He appears well-developed and well-nourished. No distress. HENT:   Right Ear: External ear normal.   Left Ear: External ear normal.   Nose: Nose normal.   Mouth/Throat: Oropharynx is clear and moist. No oropharyngeal exudate. Eyes: Conjunctivae are normal. Right eye exhibits no discharge. Left eye exhibits no discharge. Neck: Normal range of motion. Neck supple. No thyromegaly present. Cardiovascular: Normal rate, regular rhythm and normal heart sounds. Pulmonary/Chest: Effort normal and breath sounds normal.   Musculoskeletal: He exhibits no edema, tenderness or deformity. Neurological: He is alert and oriented to person, place, and time. No cranial nerve deficit. Skin: Skin is warm and dry. He is not diaphoretic. Psychiatric: He has a normal mood and affect. His behavior is normal. Judgment and thought content normal.       ASSESSMENT and PLAN    ICD-10-CM ICD-9-CM    1. Elevated fasting blood sugar R73.01 790.21 HEMOGLOBIN A1C WITH EAG      METABOLIC PANEL, COMPREHENSIVE   2. Physical exam, annual K38.77 D53.7 METABOLIC PANEL, COMPREHENSIVE   3. Essential hypertension I10 401.9 LIPID PANEL      olmesartan (BENICAR) 20 mg tablet      METABOLIC PANEL, COMPREHENSIVE   4. Morbid obesity with body mass index (BMI) of 50.0 to 59.9 in adult ([facility]) E66.01 278.01 LIPID PANEL    Z68.43 V85.43 TSH RFX ON ABNORMAL TO FREE T4     Diagnoses and all orders for this visit:    1. Elevated fasting blood sugar  -     HEMOGLOBIN A1C WITH EAG  -     METABOLIC PANEL, COMPREHENSIVE    2. Physical exam, annual  -     METABOLIC PANEL, COMPREHENSIVE    3. Essential hypertension  -     LIPID PANEL  -     olmesartan (BENICAR) 20 mg tablet; Take 1 Tab by mouth daily.  -     METABOLIC PANEL, COMPREHENSIVE    4.  Morbid obesity with body mass index (BMI) of 50.0 to 59.9 in adult ([facility])  -     LIPID PANEL  -     TSH RFX ON ABNORMAL TO FREE T4      Follow-up Disposition:  Return in about 4 weeks (around 4/12/2019) for htn.  lab results and schedule of future lab studies reviewed with patient  reviewed diet, exercise and weight control  cardiovascular risk and specific lipid/LDL goals reviewed  reviewed medications and side effects in detail    Discussed risks of elevated blood pressure, hypertension management,  lifestyle management    Discussed the patient's BMI with him. The BMI follow up plan is as follows:     dietary management education, guidance, and counseling  encourage exercise  monitor weight      An After Visit Summary was printed and given to the patient.

## 2019-03-15 NOTE — PROGRESS NOTES
Rosy Caballero is a 36 y.o. male    Room 7    Pt moved from [place] 2 yrs ago     Chief Complaint   Patient presents with    New Patient     establishing care    Hypertension     history HBP since childhood     1. Have you been to the ER, urgent care clinic since your last visit? Hospitalized since your last visit? 2/5/2019 ED & [facility] sx, back disc \"out of place\"    2. Have you seen or consulted any other health care providers outside of the [address] [place] [address] since your last visit? Include any pap smears or colon screening.  no

## 2019-03-15 NOTE — PATIENT INSTRUCTIONS
DASH Diet: Care Instructions  Your Care Instructions    The DASH diet is an eating plan that can help lower your blood pressure. DASH stands for Dietary Approaches to Stop Hypertension. Hypertension is high blood pressure. The DASH diet focuses on eating foods that are high in calcium, potassium, and magnesium. These nutrients can lower blood pressure. The foods that are highest in these nutrients are fruits, vegetables, low-fat dairy products, nuts, seeds, and legumes. But taking calcium, potassium, and magnesium supplements instead of eating foods that are high in those nutrients does not have the same effect. The DASH diet also includes whole grains, fish, and poultry. The DASH diet is one of several lifestyle changes your doctor may recommend to lower your high blood pressure. Your doctor may also want you to decrease the amount of sodium in your diet. Lowering sodium while following the DASH diet can lower blood pressure even further than just the DASH diet alone. Follow-up care is a key part of your treatment and safety. Be sure to make and go to all appointments, and call your doctor if you are having problems. It's also a good idea to know your test results and keep a list of the medicines you take. How can you care for yourself at home? Following the DASH diet  · Eat 4 to 5 servings of fruit each day. A serving is 1 medium-sized piece of fruit, ½ cup chopped or canned fruit, 1/4 cup dried fruit, or 4 ounces (½ cup) of fruit juice. Choose fruit more often than fruit juice. · Eat 4 to 5 servings of vegetables each day. A serving is 1 cup of lettuce or raw leafy vegetables, ½ cup of chopped or cooked vegetables, or 4 ounces (½ cup) of vegetable juice. Choose vegetables more often than vegetable juice. · Get 2 to 3 servings of low-fat and fat-free dairy each day. A serving is 8 ounces of milk, 1 cup of yogurt, or 1 ½ ounces of cheese. · Eat 6 to 8 servings of grains each day.  A serving is 1 slice of bread, 1 ounce of dry cereal, or ½ cup of cooked rice, pasta, or cooked cereal. Try to choose whole-grain products as much as possible. · Limit lean meat, poultry, and fish to 2 servings each day. A serving is 3 ounces, about the size of a deck of cards. · Eat 4 to 5 servings of nuts, seeds, and legumes (cooked dried beans, lentils, and split peas) each week. A serving is 1/3 cup of nuts, 2 tablespoons of seeds, or ½ cup of cooked beans or peas. · Limit fats and oils to 2 to 3 servings each day. A serving is 1 teaspoon of vegetable oil or 2 tablespoons of salad dressing. · Limit sweets and added sugars to 5 servings or less a week. A serving is 1 tablespoon jelly or jam, ½ cup sorbet, or 1 cup of lemonade. · Eat less than 2,300 milligrams (mg) of sodium a day. If you limit your sodium to 1,500 mg a day, you can lower your blood pressure even more. Tips for success  · Start small. Do not try to make dramatic changes to your diet all at once. You might feel that you are missing out on your favorite foods and then be more likely to not follow the plan. Make small changes, and stick with them. Once those changes become habit, add a few more changes. · Try some of the following:  ? Make it a goal to eat a fruit or vegetable at every meal and at snacks. This will make it easy to get the recommended amount of fruits and vegetables each day. ? Try yogurt topped with fruit and nuts for a snack or healthy dessert. ? Add lettuce, tomato, cucumber, and onion to sandwiches. ? Combine a ready-made pizza crust with low-fat mozzarella cheese and lots of vegetable toppings. Try using tomatoes, squash, spinach, broccoli, carrots, cauliflower, and onions. ? Have a variety of cut-up vegetables with a low-fat dip as an appetizer instead of chips and dip. ? Sprinkle sunflower seeds or chopped almonds over salads. Or try adding chopped walnuts or almonds to cooked vegetables.   ? Try some vegetarian meals using beans and peas. Add garbanzo or kidney beans to salads. Make burritos and tacos with mashed ariza beans or black beans. Where can you learn more? Go to http://demond-jonatan.info/. Enter R241 in the search box to learn more about \"DASH Diet: Care Instructions. \"  Current as of: July 22, 2018  Content Version: 11.9  © 3880-7977 [employer]. Care instructions adapted under license by [employer] (which disclaims liability or warranty for this information). If you have questions about a medical condition or this instruction, always ask your healthcare professional. [place] [address] any warranty or liability for your use of this information. Body Mass Index: Care Instructions  Your Care Instructions    Body mass index (BMI) can help you see if your weight is raising your risk for health problems. It uses a formula to compare how much you weigh with how tall you are. · A BMI lower than 18.5 is considered underweight. · A BMI between 18.5 and 24.9 is considered healthy. · A BMI between 25 and 29.9 is considered overweight. A BMI of 30 or higher is considered obese. If your BMI is in the normal range, it means that you have a lower risk for weight-related health problems. If your BMI is in the overweight or obese range, you may be at increased risk for weight-related health problems, such as high blood pressure, heart disease, stroke, arthritis or joint pain, and diabetes. If your BMI is in the underweight range, you may be at increased risk for health problems such as fatigue, lower protection (immunity) against illness, muscle loss, bone loss, hair loss, and hormone problems. BMI is just one measure of your risk for weight-related health problems. You may be at higher risk for health problems if you are not active, you eat an unhealthy diet, or you drink too much alcohol or use tobacco products. Follow-up care is a key part of your treatment and safety.  Be sure to make and go to all appointments, and call your doctor if you are having problems. It's also a good idea to know your test results and keep a list of the medicines you take. How can you care for yourself at home? · Practice healthy eating habits. This includes eating plenty of fruits, vegetables, whole grains, lean protein, and low-fat dairy. · If your doctor recommends it, get more exercise. Walking is a good choice. Bit by bit, increase the amount you walk every day. Try for at least 30 minutes on most days of the week. · Do not smoke. Smoking can increase your risk for health problems. If you need help quitting, talk to your doctor about stop-smoking programs and medicines. These can increase your chances of quitting for good. · Limit alcohol to 2 drinks a day for men and 1 drink a day for women. Too much alcohol can cause health problems. If you have a BMI higher than 25  · Your doctor may do other tests to check your risk for weight-related health problems. This may include measuring the distance around your waist. A waist measurement of more than 40 inches in men or 35 inches in women can increase the risk of weight-related health problems. · Talk with your doctor about steps you can take to stay healthy or improve your health. You may need to make lifestyle changes to lose weight and stay healthy, such as changing your diet and getting regular exercise. If you have a BMI lower than 18.5  · Your doctor may do other tests to check your risk for health problems. · Talk with your doctor about steps you can take to stay healthy or improve your health. You may need to make lifestyle changes to gain or maintain weight and stay healthy, such as getting more healthy foods in your diet and doing exercises to build muscle. Where can you learn more? Go to http://demond-jonatan.info/. Enter S176 in the search box to learn more about \"Body Mass Index: Care Instructions. \"  Current as of: June 25, 2018  Content Version: 11.9  © 7029-0356 [employer]. Care instructions adapted under license by [employer] (which disclaims liability or warranty for this information). If you have questions about a medical condition or this instruction, always ask your healthcare professional. [address] any warranty or liability for your use of this information. Learning About High Blood Pressure  What is high blood pressure? Blood pressure is a measure of how hard the blood pushes against the walls of your arteries. It's normal for blood pressure to go up and down throughout the day, but if it stays up, you have high blood pressure. Another name for high blood pressure is hypertension. Two numbers tell you your blood pressure. The first number is the systolic pressure. It shows how hard the blood pushes when your heart is pumping. The second number is the diastolic pressure. It shows how hard the blood pushes between heartbeats, when your heart is relaxed and filling with blood. Your doctor will give you a goal for your blood pressure. Your goal will be based on your health and your age. High blood pressure (hypertension) means that the top number stays high, or the bottom number stays high, or both. High blood pressure increases the risk of stroke, heart attack, and other problems. You and your doctor will talk about your risks of these problems based on your blood pressure. What happens when you have high blood pressure? · Blood flows through your arteries with too much force. Over time, this damages the walls of your arteries. But you can't feel it. High blood pressure usually doesn't cause symptoms. · Fat and calcium start to build up in your arteries. This buildup is called plaque. Plaque makes your arteries narrower and stiffer. Blood can't flow through them as easily. · This lack of good blood flow starts to damage some of the organs in your body. This can lead to problems such as coronary artery disease and heart attack, heart failure, stroke, kidney failure, and eye damage. How can you prevent high blood pressure? · Stay at a healthy weight. · Try to limit how much sodium you eat to less than 2,300 milligrams (mg) a day. If you limit your sodium to 1,500 mg a day, you can lower your blood pressure even more. ? Buy foods that are labeled \"unsalted,\" \"sodium-free,\" or \"low-sodium. \" Foods labeled \"reduced-sodium\" and \"light sodium\" may still have too much sodium. ? Flavor your food with garlic, lemon juice, onion, vinegar, herbs, and spices instead of salt. Do not use soy sauce, steak sauce, onion salt, garlic salt, mustard, or ketchup on your food. ? Use less salt (or none) when recipes call for it. You can often use half the salt a recipe calls for without losing flavor. · Be physically active. Get at least 30 minutes of exercise on most days of the week. Walking is a good choice. You also may want to do other activities, such as running, swimming, cycling, or playing tennis or team sports. · Limit alcohol to 2 drinks a day for men and 1 drink a day for women. · Eat plenty of fruits, vegetables, and low-fat dairy products. Eat less saturated and total fats. How is high blood pressure treated? · Your doctor will suggest making lifestyle changes. For example, your doctor may ask you to eat healthy foods, quit smoking, lose extra weight, and be more active. · If lifestyle changes don't help enough, your doctor may recommend that you take medicine. · When blood pressure is very high, medicines are needed to lower it. Follow-up care is a key part of your treatment and safety. Be sure to make and go to all appointments, and call your doctor if you are having problems. It's also a good idea to know your test results and keep a list of the medicines you take. Where can you learn more? Go to http://demond-jonatan.info/.   Enter P505 in the search box to learn more about \"Learning About High Blood Pressure. \"  Current as of: July 22, 2018  Content Version: 11.9  © 7348-2675 [employer]. Care instructions adapted under license by [employer] (which disclaims liability or warranty for this information). If you have questions about a medical condition or this instruction, always ask your healthcare professional. [address] any warranty or liability for your use of this information. Starting a Weight Loss Plan: Care Instructions  Your Care Instructions    If you are thinking about losing weight, it can be hard to know where to start. Your doctor can help you set up a weight loss plan that best meets your needs. You may want to take a class on nutrition or exercise, or join a weight loss support group. If you have questions about how to make changes to your eating or exercise habits, ask your doctor about seeing a registered dietitian or an exercise specialist.  It can be a big challenge to lose weight. But you do not have to make huge changes at once. Make small changes, and stick with them. When those changes become habit, add a few more changes. If you do not think you are ready to make changes right now, try to pick a date in the future. Make an appointment to see your doctor to discuss whether the time is right for you to start a plan. Follow-up care is a key part of your treatment and safety. Be sure to make and go to all appointments, and call your doctor if you are having problems. It's also a good idea to know your test results and keep a list of the medicines you take. How can you care for yourself at home? · Set realistic goals. Many people expect to lose much more weight than is likely. A weight loss of 5% to 10% of your body weight may be enough to improve your health. · Get family and friends involved to provide support.  Talk to them about why you are trying to lose weight, and ask them to help. They can help by participating in exercise and having meals with you, even if they may be eating something different. · Find what works best for you. If you do not have time or do not like to cook, a program that offers meal replacement bars or shakes may be better for you. Or if you like to prepare meals, finding a plan that includes daily menus and recipes may be best.  · Ask your doctor about other health professionals who can help you achieve your weight loss goals. ? A dietitian can help you make healthy changes in your diet. ? An exercise specialist or  can help you develop a safe and effective exercise program.  ? A counselor or psychiatrist can help you cope with issues such as depression, anxiety, or family problems that can make it hard to focus on weight loss. · Consider joining a support group for people who are trying to lose weight. Your doctor can suggest groups in your area. Where can you learn more? Go to http://demondBattleprojonatan.info/. Enter Z076 in the search box to learn more about \"Starting a Weight Loss Plan: Care Instructions. \"  Current as of: June 25, 2018  Content Version: 11.9  © 8439-9741 [employer]. Care instructions adapted under license by [employer] (which disclaims liability or warranty for this information). If you have questions about a medical condition or this instruction, always ask your healthcare professional. [place] [address] any warranty or liability for your use of this information. Well Visit, Ages 25 to 48: Care Instructions  Your Care Instructions    Physical exams can help you stay healthy. Your doctor has checked your overall health and may have suggested ways to take good care of yourself. He or she also may have recommended tests. At home, you can help prevent illness with healthy eating, regular exercise, and other steps.   Follow-up care is a key part of your treatment and safety. Be sure to make and go to all appointments, and call your doctor if you are having problems. It's also a good idea to know your test results and keep a list of the medicines you take. How can you care for yourself at home? · Reach and stay at a healthy weight. This will lower your risk for many problems, such as obesity, diabetes, heart disease, and high blood pressure. · Get at least 30 minutes of physical activity on most days of the week. Walking is a good choice. You also may want to do other activities, such as running, swimming, cycling, or playing tennis or team sports. Discuss any changes in your exercise program with your doctor. · Do not smoke or allow others to smoke around you. If you need help quitting, talk to your doctor about stop-smoking programs and medicines. These can increase your chances of quitting for good. · Talk to your doctor about whether you have any risk factors for sexually transmitted infections (STIs). Having one sex partner (who does not have STIs and does not have sex with anyone else) is a good way to avoid these infections. · Use birth control if you do not want to have children at this time. Talk with your doctor about the choices available and what might be best for you. · Protect your skin from too much sun. When you're outdoors from 10 a.m. to 4 p.m., stay in the shade or cover up with clothing and a hat with a wide brim. Wear sunglasses that block UV rays. Even when it's cloudy, put broad-spectrum sunscreen (SPF 30 or higher) on any exposed skin. · See a dentist one or two times a year for checkups and to have your teeth cleaned. · Wear a seat belt in the car. · Drink alcohol in moderation, if at all. That means no more than 2 drinks a day for men and 1 drink a day for women. Follow your doctor's advice about when to have certain tests. These tests can spot problems early. For everyone  · Cholesterol.  Have the fat (cholesterol) in your blood tested after age 21. Your doctor will tell you how often to have this done based on your age, family history, or other things that can increase your risk for heart disease. · Blood pressure. Have your blood pressure checked during a routine doctor visit. Your doctor will tell you how often to check your blood pressure based on your age, your blood pressure results, and other factors. · Vision. Talk with your doctor about how often to have a glaucoma test.  · Diabetes. Ask your doctor whether you should have tests for diabetes. · Colon cancer. Have a test for colon cancer at age 48. You may have one of several tests. If you are younger than 48, you may need a test earlier if you have any risk factors. Risk factors include whether you already had a precancerous polyp removed from your colon or whether your parent, brother, sister, or child has had colon cancer. For women  · Breast exam and mammogram. Talk to your doctor about when you should have a clinical breast exam and a mammogram. Medical experts differ on whether and how often women under 50 should have these tests. Your doctor can help you decide what is right for you. · Pap test and pelvic exam. Begin Pap tests at age 24. A Pap test is the best way to find cervical cancer. The test often is part of a pelvic exam. Ask how often to have this test.  · Tests for sexually transmitted infections (STIs). Ask whether you should have tests for STIs. You may be at risk if you have sex with more than one person, especially if your partners do not wear condoms. For men  · Tests for sexually transmitted infections (STIs). Ask whether you should have tests for STIs. You may be at risk if you have sex with more than one person, especially if you do not wear a condom. · Testicular cancer exam. Ask your doctor whether you should check your testicles regularly.   · Prostate exam. Talk to your doctor about whether you should have a blood test (called a PSA test) for prostate cancer. Experts differ on whether and when men should have this test. Some experts suggest it if you are older than 39 and are -American or have a father or brother who got prostate cancer when he was younger than 72. When should you call for help? Watch closely for changes in your health, and be sure to contact your doctor if you have any problems or symptoms that concern you. Where can you learn more? Go to http://demond-jonatan.info/. Enter P072 in the search box to learn more about \"Well Visit, Ages 25 to 48: Care Instructions. \"  Current as of: March 28, 2018  Content Version: 11.9  © 8776-7181 [employer]. Care instructions adapted under license by [employer] (which disclaims liability or warranty for this information). If you have questions about a medical condition or this instruction, always ask your healthcare professional. [address] any warranty or liability for your use of this information. Body Mass Index: Care Instructions  Your Care Instructions    Body mass index (BMI) can help you see if your weight is raising your risk for health problems. It uses a formula to compare how much you weigh with how tall you are. · A BMI lower than 18.5 is considered underweight. · A BMI between 18.5 and 24.9 is considered healthy. · A BMI between 25 and 29.9 is considered overweight. A BMI of 30 or higher is considered obese. If your BMI is in the normal range, it means that you have a lower risk for weight-related health problems. If your BMI is in the overweight or obese range, you may be at increased risk for weight-related health problems, such as high blood pressure, heart disease, stroke, arthritis or joint pain, and diabetes.  If your BMI is in the underweight range, you may be at increased risk for health problems such as fatigue, lower protection (immunity) against illness, muscle loss, bone loss, hair loss, and hormone problems. BMI is just one measure of your risk for weight-related health problems. You may be at higher risk for health problems if you are not active, you eat an unhealthy diet, or you drink too much alcohol or use tobacco products. Follow-up care is a key part of your treatment and safety. Be sure to make and go to all appointments, and call your doctor if you are having problems. It's also a good idea to know your test results and keep a list of the medicines you take. How can you care for yourself at home? · Practice healthy eating habits. This includes eating plenty of fruits, vegetables, whole grains, lean protein, and low-fat dairy. · If your doctor recommends it, get more exercise. Walking is a good choice. Bit by bit, increase the amount you walk every day. Try for at least 30 minutes on most days of the week. · Do not smoke. Smoking can increase your risk for health problems. If you need help quitting, talk to your doctor about stop-smoking programs and medicines. These can increase your chances of quitting for good. · Limit alcohol to 2 drinks a day for men and 1 drink a day for women. Too much alcohol can cause health problems. If you have a BMI higher than 25  · Your doctor may do other tests to check your risk for weight-related health problems. This may include measuring the distance around your waist. A waist measurement of more than 40 inches in men or 35 inches in women can increase the risk of weight-related health problems. · Talk with your doctor about steps you can take to stay healthy or improve your health. You may need to make lifestyle changes to lose weight and stay healthy, such as changing your diet and getting regular exercise. If you have a BMI lower than 18.5  · Your doctor may do other tests to check your risk for health problems. · Talk with your doctor about steps you can take to stay healthy or improve your health.  You may need to make lifestyle changes to gain or maintain weight and stay healthy, such as getting more healthy foods in your diet and doing exercises to build muscle. Where can you learn more? Go to http://demond-jonatan.info/. Enter S176 in the search box to learn more about \"Body Mass Index: Care Instructions. \"  Current as of: October 13, 2016  Content Version: 11.4  © 5233-7063 [employer]. Care instructions adapted under license by [employer] (which disclaims liability or warranty for this information). If you have questions about a medical condition or this instruction, always ask your healthcare professional. [place] [address] any warranty or liability for your use of this information.

## 2019-03-16 LAB
ALBUMIN SERPL-MCNC: 4.5 G/DL (ref 3.5–5.5)
ALBUMIN/GLOB SERPL: 1.7 {RATIO} (ref 1.2–2.2)
ALP SERPL-CCNC: 51 IU/L (ref 39–117)
ALT SERPL-CCNC: 101 IU/L (ref 0–44)
AST SERPL-CCNC: 50 IU/L (ref 0–40)
BILIRUB SERPL-MCNC: 0.3 MG/DL (ref 0–1.2)
BUN SERPL-MCNC: 9 MG/DL (ref 6–20)
BUN/CREAT SERPL: 12 (ref 9–20)
CALCIUM SERPL-MCNC: 8.9 MG/DL (ref 8.7–10.2)
CHLORIDE SERPL-SCNC: 104 MMOL/L (ref 96–106)
CHOLEST SERPL-MCNC: 191 MG/DL (ref 100–199)
CO2 SERPL-SCNC: 21 MMOL/L (ref 20–29)
CREAT SERPL-MCNC: 0.78 MG/DL (ref 0.76–1.27)
EST. AVERAGE GLUCOSE BLD GHB EST-MCNC: 111 MG/DL
GLOBULIN SER CALC-MCNC: 2.7 G/DL (ref 1.5–4.5)
GLUCOSE SERPL-MCNC: 145 MG/DL (ref 65–99)
HBA1C MFR BLD: 5.5 % (ref 4.8–5.6)
HDLC SERPL-MCNC: 36 MG/DL
LDLC SERPL CALC-MCNC: 128 MG/DL (ref 0–99)
POTASSIUM SERPL-SCNC: 4.4 MMOL/L (ref 3.5–5.2)
PROT SERPL-MCNC: 7.2 G/DL (ref 6–8.5)
SODIUM SERPL-SCNC: 140 MMOL/L (ref 134–144)
TRIGL SERPL-MCNC: 136 MG/DL (ref 0–149)
TSH SERPL DL<=0.005 MIU/L-ACNC: 1.63 UIU/ML (ref 0.45–4.5)
VLDLC SERPL CALC-MCNC: 27 MG/DL (ref 5–40)

## 2019-03-18 ENCOUNTER — TELEPHONE (OUTPATIENT)
Dept: INTERNAL MEDICINE CLINIC | Age: 34
End: 2019-03-18

## 2019-03-18 DIAGNOSIS — R74.8 ELEVATED LIVER ENZYMES: Primary | ICD-10-CM

## 2019-03-20 LAB
HAV IGM SERPL QL IA: NEGATIVE
HBV CORE IGM SERPL QL IA: NEGATIVE
HBV SURFACE AG SERPL QL IA: NEGATIVE
HCV AB S/CO SERPL IA: <0.1 S/CO RATIO (ref 0–0.9)
SPECIMEN STATUS REPORT, ROLRST: NORMAL

## 2022-03-18 PROBLEM — G83.4 CAUDA EQUINA SYNDROME (HCC): Status: ACTIVE | Noted: 2019-02-05

## 2022-03-19 PROBLEM — M51.26 LUMBAR DISC HERNIATION: Status: ACTIVE | Noted: 2019-02-05

## 2023-08-21 ENCOUNTER — NURSE TRIAGE (OUTPATIENT)
Dept: OTHER | Facility: CLINIC | Age: 38
End: 2023-08-21

## 2023-08-21 NOTE — TELEPHONE ENCOUNTER
[address] call from  [facility]; Patient with [employer] Complaint requesting to establish care with [facility]. Subjective: Caller states \"My Neuro told me it was the years of playing sports\"     Current Symptoms: back pain (history of back surgery two years), pain goes down the back of the legs, sometimes feels tingling in his legs    Onset: 5 days ago; worsening    Associated Symptoms: increased wakefulness    Pain Severity: 6-9/10; shooting; constant    Temperature: denies       What has been tried: Steroids ([facility]), Ibuprofen, Suzi back and body, not taking his prescribed muscle relaxers, ice packs, drinking extra fluids    LMP: NA Pregnant: NA    Recommended disposition: See HCP within 4 Hours (or PCP triage) Patient will be seen in the [address] [place] [address] Road advice provided, patient verbalizes understanding; denies any other questions or concerns; instructed to call back for any new or worsening symptoms. Patient/caller agrees to proceed to nearest [facility] ; Warm transfer to [address] [place] [address] at the [facility] for scheduling a new patient appt. Attention Provider: Thank you for allowing me to participate in the care of your patient. The patient was connected to triage in response to information provided to the [facility]. Please do not respond through this encounter as the response is not directed to a shared pool.       Reason for Disposition   [1] Pain radiates into the thigh or further down the leg AND [2] both legs    Protocols used: Back Pain-ADULT-

## (undated) DEVICE — MEDI-VAC NON-CONDUCTIVE SUCTION TUBING: Brand: CARDINAL HEALTH

## (undated) DEVICE — SUTURE VCRL SZ 2-0 L18IN ABSRB UD L26MM CP-2 1/2 CIR REV J762D

## (undated) DEVICE — BLADE ELECTRODE: Brand: EDGE

## (undated) DEVICE — SUTURE MCRYL SZ 3-0 L27IN ABSRB UD L19MM PS-2 3/8 CIR PRIM Y427H

## (undated) DEVICE — SUTURE VCRL SZ 0 L45CM ABSRB VLT OS-6 L36.4MM 1/2 CIR REV J711T

## (undated) DEVICE — SOLUTION IV 1000ML 0.9% SOD CHL

## (undated) DEVICE — NEEDLE HYPO 22GA L1.5IN BLK S STL HUB POLYPR SHLD REG BVL

## (undated) DEVICE — STERILE POLYISOPRENE POWDER-FREE SURGICAL GLOVES WITH EMOLLIENT COATING: Brand: PROTEXIS

## (undated) DEVICE — INFECTION CONTROL KIT SYS

## (undated) DEVICE — PREP SKN PREVAIL 40ML APPL --

## (undated) DEVICE — BIPOLAR FORCEPS CORD: Brand: VALLEYLAB

## (undated) DEVICE — Z CONVERTED USE 2271043 CONTAINER SPEC COLL 4OZ SCR ON LID PEEL PCH

## (undated) DEVICE — HANDLE LT SNAP ON ULT DURABLE LENS FOR TRUMPF ALC DISPOSABLE

## (undated) DEVICE — PILLOW POS AD L7IN R FOAM HD REST INTUB SLOT DISP

## (undated) DEVICE — FLOSEAL HEMOSTATIC MATRIX, 5 ML: Brand: FLOSEAL

## (undated) DEVICE — INTENDED FOR TISSUE SEPARATION, AND OTHER PROCEDURES THAT REQUIRE A SHARP SURGICAL BLADE TO PUNCTURE OR CUT.: Brand: BARD-PARKER ® CARBON RIB-BACK BLADES

## (undated) DEVICE — 1200 GUARD II KIT W/5MM TUBE W/O VAC TUBE: Brand: GUARDIAN

## (undated) DEVICE — LAMINECTOMY RICHMOND-LF: Brand: MEDLINE INDUSTRIES, INC.

## (undated) DEVICE — BONE WAX WHITE: Brand: BONE WAX WHITE

## (undated) DEVICE — DEVON™ KNEE AND BODY STRAP 60" X 3" (1.5 M X 7.6 CM): Brand: DEVON